# Patient Record
Sex: FEMALE | Race: WHITE | NOT HISPANIC OR LATINO | Employment: OTHER | URBAN - NONMETROPOLITAN AREA
[De-identification: names, ages, dates, MRNs, and addresses within clinical notes are randomized per-mention and may not be internally consistent; named-entity substitution may affect disease eponyms.]

---

## 2017-03-31 RX ORDER — ESTRADIOL 1 MG/1
TABLET ORAL
Qty: 30 TABLET | Refills: 11 | Status: SHIPPED | OUTPATIENT
Start: 2017-03-31 | End: 2018-03-20 | Stop reason: SDUPTHER

## 2017-06-06 ENCOUNTER — TRANSCRIBE ORDERS (OUTPATIENT)
Dept: ADMINISTRATIVE | Facility: HOSPITAL | Age: 57
End: 2017-06-06

## 2017-06-06 DIAGNOSIS — Z13.9 SCREENING: Primary | ICD-10-CM

## 2017-06-26 ENCOUNTER — HOSPITAL ENCOUNTER (OUTPATIENT)
Dept: MAMMOGRAPHY | Facility: HOSPITAL | Age: 57
Discharge: HOME | End: 2017-06-26
Attending: OBSTETRICS & GYNECOLOGY | Admitting: OBSTETRICS & GYNECOLOGY

## 2017-06-26 DIAGNOSIS — Z13.9 SCREENING: ICD-10-CM

## 2017-06-26 PROCEDURE — 77063 BREAST TOMOSYNTHESIS BI: CPT

## 2017-06-26 PROCEDURE — G0202 SCR MAMMO BI INCL CAD: HCPCS

## 2018-03-20 RX ORDER — ESTRADIOL 1 MG/1
TABLET ORAL
Qty: 30 TABLET | Refills: 1 | Status: SHIPPED | OUTPATIENT
Start: 2018-03-20 | End: 2018-05-18 | Stop reason: SDUPTHER

## 2018-05-18 RX ORDER — ESTRADIOL 1 MG/1
TABLET ORAL
Qty: 30 TABLET | Refills: 1 | Status: SHIPPED | OUTPATIENT
Start: 2018-05-18 | End: 2018-06-08 | Stop reason: SDUPTHER

## 2018-05-31 ENCOUNTER — TRANSCRIBE ORDERS (OUTPATIENT)
Dept: OBSTETRICS AND GYNECOLOGY | Facility: CLINIC | Age: 58
End: 2018-05-31

## 2018-05-31 DIAGNOSIS — Z12.39 SCREENING BREAST EXAMINATION: Primary | ICD-10-CM

## 2018-06-08 ENCOUNTER — OFFICE VISIT (OUTPATIENT)
Dept: OBSTETRICS AND GYNECOLOGY | Facility: CLINIC | Age: 58
End: 2018-06-08

## 2018-06-08 VITALS
DIASTOLIC BLOOD PRESSURE: 70 MMHG | BODY MASS INDEX: 26.31 KG/M2 | SYSTOLIC BLOOD PRESSURE: 118 MMHG | WEIGHT: 143 LBS | HEIGHT: 62 IN

## 2018-06-08 DIAGNOSIS — Z01.419 ENCOUNTER FOR GYNECOLOGICAL EXAMINATION WITHOUT ABNORMAL FINDING: Primary | ICD-10-CM

## 2018-06-08 PROCEDURE — 99396 PREV VISIT EST AGE 40-64: CPT | Performed by: OBSTETRICS & GYNECOLOGY

## 2018-06-08 RX ORDER — SUMATRIPTAN 100 MG/1
TABLET, FILM COATED ORAL
Refills: 0 | COMMUNITY
Start: 2018-05-21

## 2018-06-08 RX ORDER — FLUOXETINE HYDROCHLORIDE 20 MG/1
CAPSULE ORAL
Refills: 0 | COMMUNITY
Start: 2018-05-18 | End: 2022-07-19

## 2018-06-08 RX ORDER — FEXOFENADINE HCL 180 MG
TABLET ORAL
Refills: 0 | COMMUNITY
Start: 2018-05-14

## 2018-06-08 RX ORDER — OMEPRAZOLE 20 MG/1
CAPSULE, DELAYED RELEASE ORAL
Refills: 0 | COMMUNITY
Start: 2018-04-11 | End: 2022-03-14

## 2018-06-08 RX ORDER — ESTRADIOL 1 MG/1
1 TABLET ORAL DAILY
Qty: 30 TABLET | Refills: 12 | Status: SHIPPED | OUTPATIENT
Start: 2018-06-08 | End: 2019-07-06 | Stop reason: SDUPTHER

## 2018-06-08 RX ORDER — OXYBUTYNIN CHLORIDE 10 MG/1
TABLET, EXTENDED RELEASE ORAL
Refills: 1 | COMMUNITY
Start: 2018-05-10 | End: 2018-06-08

## 2018-06-08 RX ORDER — PRAVASTATIN SODIUM 20 MG
TABLET ORAL
Refills: 0 | COMMUNITY
Start: 2018-03-12 | End: 2019-09-18

## 2018-06-08 RX ORDER — SOLIFENACIN SUCCINATE 10 MG/1
10 TABLET, FILM COATED ORAL DAILY
Qty: 30 TABLET | Refills: 12 | Status: SHIPPED | OUTPATIENT
Start: 2018-06-08 | End: 2018-07-03

## 2018-06-08 NOTE — PROGRESS NOTES
Subjective   Chief Complaint   Patient presents with   • Gynecologic Exam     Last pap 3/29/2016 WNL     Nancy Richardson is a 57 y.o. year old  presenting to be seen for her annual exam.     SEXUAL Hx:  She is currently sexually active.  In the past year there has not been new sexual partners.    Condoms are not typically used.  She would not like to be screened for STD's at today's exam.  Current birth control method: menopause.  MENSTRUAL Hx:  No LMP recorded. Patient is postmenopausal.  In the past 6 months her cycles have been absent.   Her menstrual flow has been absent.   Each month on average there are roughly 0 days of very heavy flow.    Intermenstrual bleeding is absent.    Post-coital bleeding is absent.  Dysmenorrhea: is not affecting her activities of daily living  PMS: is not affecting her activities of daily living  Her cycles are not a source of concern for her that she wishes to discuss today.  HEALTH Hx:  She exercises regularly: no (and has no plans to become more active).  She wears her seat belt:yes.  She has concerns about domestic violence: no.  OTHER COMPLAINTS:  Nothing else    The following portions of the patient's history were reviewed and updated as appropriate:  She  has a past medical history of Depression.  She  does not have a problem list on file.  She  has a past surgical history that includes Oophorectomy;  section; and Anterior cruciate ligament repair.  Her family history includes Breast cancer in her maternal aunt, mother, and sister.  She  reports that she has never smoked. She has never used smokeless tobacco. She reports that she does not drink alcohol or use drugs.  Current Outpatient Prescriptions   Medication Sig Dispense Refill   • estradiol (ESTRACE) 1 MG tablet take 1 tablet by mouth once daily 30 tablet 1   • FLUoxetine (PROzac) 20 MG capsule TAKE 3 CAPSULES IN THE MORNING  0   • omeprazole (priLOSEC) 20 MG capsule   0   • oxybutynin XL  "(DITROPAN-XL) 10 MG 24 hr tablet   1   • pravastatin (PRAVACHOL) 20 MG tablet   0   • RA ALLERGY RELIEF 180 MG tablet   0   • SUMAtriptan (IMITREX) 100 MG tablet   0     No current facility-administered medications for this visit.      Current Outpatient Prescriptions on File Prior to Visit   Medication Sig   • estradiol (ESTRACE) 1 MG tablet take 1 tablet by mouth once daily     No current facility-administered medications on file prior to visit.      She is allergic to sulfa antibiotics..    Smoking status: Never Smoker                                                              Smokeless tobacco: Never Used                        Review of Systems  Consitutional NEG: anorexia or night sweats    POS: nothing reported   Gastointestinal NEG: bloating, change in bowel habits, melena or reflux symptoms    POS: nothing reported   Genitourinary NEG: dysuria or hematuria    POS: nothing reported   Integument NEG: moles that are changing in size, shape, color or rashes    POS: nothing reported   Breast NEG: persistent breast lump, skin dimpling or nipple discharge    POS: nothing reported          Objective   /70   Ht 157.5 cm (62\")   Wt 64.9 kg (143 lb)   BMI 26.16 kg/m²     General:  well developed; well nourished  no acute distress   Skin:  No suspicious lesions seen   Thyroid: normal to inspection and palpation   Breasts:  Examined in supine position  Symmetric without masses or skin dimpling  Nipples normal without inversion, lesions or discharge  There are no palpable axillary nodes   Abdomen: soft, non-tender; no masses  no umbilical or inginual hernias are present  no hepato-splenomegaly   Pelvis: Clinical staff was present for exam  External genitalia:  normal appearance of the external genitalia including Bartholin's and [place]'s glands.  :  urethral meatus normal;  Vaginal:  normal pink mucosa without prolapse or lesions.  Cervix:  normal appearance.  Uterus:  normal size, shape and " consistency.  Adnexa:  normal bimanual exam of the adnexa.  Rectal:  digital rectal exam not performed; anus visually normal appearing.        Assessment   1. Annual PE  2. UI     Plan   1. PAP done  2. Estraiol 1mg , and trial of Vesicare 5  3. Follow up     No orders of the defined types were placed in this encounter.         This note was electronically signed.      June 8, 2018

## 2018-06-14 ENCOUNTER — TELEPHONE (OUTPATIENT)
Dept: OBSTETRICS AND GYNECOLOGY | Facility: CLINIC | Age: 58
End: 2018-06-14

## 2018-06-14 DIAGNOSIS — Z01.419 ENCOUNTER FOR GYNECOLOGICAL EXAMINATION WITHOUT ABNORMAL FINDING: ICD-10-CM

## 2018-06-14 RX ORDER — FLUCONAZOLE 150 MG/1
150 TABLET ORAL ONCE
Qty: 1 TABLET | Refills: 0 | Status: SHIPPED | OUTPATIENT
Start: 2018-06-14 | End: 2018-06-14

## 2018-07-03 ENCOUNTER — TELEPHONE (OUTPATIENT)
Dept: OBSTETRICS AND GYNECOLOGY | Facility: CLINIC | Age: 58
End: 2018-07-03

## 2018-07-03 RX ORDER — FLUCONAZOLE 150 MG/1
TABLET ORAL
Qty: 2 TABLET | Refills: 0 | Status: SHIPPED | OUTPATIENT
Start: 2018-07-03 | End: 2019-09-18

## 2018-07-03 NOTE — TELEPHONE ENCOUNTER
Pt called stating that her insurance would not cover vesicare and wants to know if we can send her something else in. She has been on Oxybutin in the past and it did not work

## 2018-07-03 NOTE — TELEPHONE ENCOUNTER
----- Message from Tiffanie Meade sent at 7/2/2018  2:46 PM EDT -----  Contact: PATIENT  JOSE, PATIENT CALLED STATING THAT YOU WERE SUPPOSED TO CALL HER IN [facility] FOR AN YEAST INFECTION. SHE WANTED TO LET YOU KNOW THAT SHE IS BACK FROM [place] AND TO SEND IT IN.    [facility] [place] [facility] [place]  THANKS.

## 2018-07-05 ENCOUNTER — HOSPITAL ENCOUNTER (OUTPATIENT)
Dept: MAMMOGRAPHY | Facility: HOSPITAL | Age: 58
Discharge: HOME | End: 2018-07-05
Attending: OBSTETRICS & GYNECOLOGY | Admitting: OBSTETRICS & GYNECOLOGY

## 2018-07-05 DIAGNOSIS — Z12.39 SCREENING BREAST EXAMINATION: ICD-10-CM

## 2018-07-05 PROCEDURE — 77067 SCR MAMMO BI INCL CAD: CPT

## 2018-07-05 PROCEDURE — 77063 BREAST TOMOSYNTHESIS BI: CPT

## 2018-07-18 ENCOUNTER — TELEPHONE (OUTPATIENT)
Dept: OBSTETRICS AND GYNECOLOGY | Facility: CLINIC | Age: 58
End: 2018-07-18

## 2018-07-18 NOTE — TELEPHONE ENCOUNTER
Pt insurance did not cover Myrbetriq and wants to know if we can just refill her old prescription of Oxybutynin?

## 2018-07-19 RX ORDER — OXYBUTYNIN CHLORIDE 10 MG/1
10 TABLET, EXTENDED RELEASE ORAL DAILY
Qty: 30 TABLET | Refills: 11 | Status: SHIPPED | OUTPATIENT
Start: 2018-07-19 | End: 2019-08-09 | Stop reason: SDUPTHER

## 2019-07-08 RX ORDER — ESTRADIOL 1 MG/1
TABLET ORAL
Qty: 30 TABLET | Refills: 0 | Status: SHIPPED | OUTPATIENT
Start: 2019-07-08 | End: 2019-09-18 | Stop reason: SDUPTHER

## 2019-07-31 RX ORDER — OXYBUTYNIN CHLORIDE 10 MG/1
TABLET, EXTENDED RELEASE ORAL
Qty: 30 TABLET | Refills: 11 | OUTPATIENT
Start: 2019-07-31

## 2019-07-31 RX ORDER — ESTRADIOL 1 MG/1
TABLET ORAL
Qty: 30 TABLET | Refills: 0 | OUTPATIENT
Start: 2019-07-31

## 2019-08-09 RX ORDER — OXYBUTYNIN CHLORIDE 10 MG/1
TABLET, EXTENDED RELEASE ORAL
Qty: 30 TABLET | Refills: 11 | Status: SHIPPED | OUTPATIENT
Start: 2019-08-09 | End: 2019-09-18 | Stop reason: SDUPTHER

## 2019-09-16 ENCOUNTER — TRANSCRIBE ORDERS (OUTPATIENT)
Dept: MAMMOGRAPHY | Facility: HOSPITAL | Age: 59
End: 2019-09-16

## 2019-09-16 DIAGNOSIS — Z12.39 BREAST CANCER SCREENING: Primary | ICD-10-CM

## 2019-09-18 ENCOUNTER — OFFICE VISIT (OUTPATIENT)
Dept: OBSTETRICS AND GYNECOLOGY | Facility: CLINIC | Age: 59
End: 2019-09-18

## 2019-09-18 VITALS
HEIGHT: 62 IN | BODY MASS INDEX: 24.84 KG/M2 | DIASTOLIC BLOOD PRESSURE: 62 MMHG | SYSTOLIC BLOOD PRESSURE: 110 MMHG | WEIGHT: 135 LBS

## 2019-09-18 DIAGNOSIS — Z01.419 ENCOUNTER FOR GYNECOLOGICAL EXAMINATION WITHOUT ABNORMAL FINDING: Primary | ICD-10-CM

## 2019-09-18 PROCEDURE — 99396 PREV VISIT EST AGE 40-64: CPT | Performed by: OBSTETRICS & GYNECOLOGY

## 2019-09-18 RX ORDER — ESTRADIOL 1 MG/1
1 TABLET ORAL DAILY
Qty: 30 TABLET | Refills: 12 | Status: SHIPPED | OUTPATIENT
Start: 2019-09-18 | End: 2020-10-09

## 2019-09-18 RX ORDER — MELOXICAM 7.5 MG/1
7.5 TABLET ORAL DAILY
Refills: 0 | COMMUNITY
Start: 2019-08-14 | End: 2022-07-19

## 2019-09-18 RX ORDER — HEPATITIS A VACCINE 1440 [IU]/ML
INJECTION, SUSPENSION INTRAMUSCULAR
Refills: 0 | COMMUNITY
Start: 2019-08-14

## 2019-09-18 RX ORDER — OXYBUTYNIN CHLORIDE 10 MG/1
10 TABLET, EXTENDED RELEASE ORAL DAILY
Qty: 30 TABLET | Refills: 12 | Status: SHIPPED | OUTPATIENT
Start: 2019-09-18 | End: 2020-09-08

## 2019-09-18 RX ORDER — EZETIMIBE 10 MG/1
TABLET ORAL
Refills: 0 | COMMUNITY
Start: 2019-07-15

## 2019-09-18 NOTE — PROGRESS NOTES
Subjective   Chief Complaint   Patient presents with   • Gynecologic Exam     Last pap 2018 WNL, Mammogram scheduled 2019   • Med Refill     needs yearly refill estrace  and oxybutynin     Nancy Richardson is a 58 y.o. year old  presenting to be seen for her annual exam.     SEXUAL Hx:  She is currently sexually active.  In the past year there has not been new sexual partners.    Condoms are not typically used.  She would not like to be screened for STD's at today's exam.  Current birth control method: tubal ligation.  MENSTRUAL Hx:  No LMP recorded. Patient has had a hysterectomy.  In the past 6 months her cycles have been absent.   Her menstrual flow has been absent.   Each month on average there are roughly 0 days of very heavy flow.    Intermenstrual bleeding is absent.    Post-coital bleeding is absent.  Dysmenorrhea: is not affecting her activities of daily living  PMS: is not affecting her activities of daily living  Her cycles are not a source of concern for her that she wishes to discuss today.  HEALTH Hx:  She exercises regularly: no (and has no plans to become more active).  She wears her seat belt:yes.  She has concerns about domestic violence: no.  OTHER COMPLAINTS:  Nothing else    The following portions of the patient's history were reviewed and updated as appropriate:  She  has a past medical history of Depression.  She does not have a problem list on file.  She  has a past surgical history that includes Oophorectomy;  section; and Anterior cruciate ligament repair.  Her family history includes Breast cancer in her maternal aunt, mother, and sister.  She  reports that she has never smoked. She has never used smokeless tobacco. She reports that she does not drink alcohol or use drugs.  Current Outpatient Medications   Medication Sig Dispense Refill   • FLUoxetine (PROzac) 20 MG capsule TAKE 3 CAPSULES IN THE MORNING  0   • estradiol (ESTRACE) 1 MG tablet Take 1 tablet by  mouth Daily. 30 tablet 12   • ezetimibe (ZETIA) 10 MG tablet   0   • HAVRIX 1440 EL U/ML vaccine inject 1 milliliter intramuscularly  0   • meloxicam (MOBIC) 7.5 MG tablet Take 7.5 mg by mouth Daily. take with food or milk  0   • omeprazole (priLOSEC) 20 MG capsule   0   • oxybutynin XL (DITROPAN-XL) 10 MG 24 hr tablet Take 1 tablet by mouth Daily. 30 tablet 12   • RA ALLERGY RELIEF 180 MG tablet   0   • SUMAtriptan (IMITREX) 100 MG tablet   0     No current facility-administered medications for this visit.      Current Outpatient Medications on File Prior to Visit   Medication Sig   • FLUoxetine (PROzac) 20 MG capsule TAKE 3 CAPSULES IN THE MORNING   • ezetimibe (ZETIA) 10 MG tablet    • HAVRIX 1440 EL U/ML vaccine inject 1 milliliter intramuscularly   • meloxicam (MOBIC) 7.5 MG tablet Take 7.5 mg by mouth Daily. take with food or milk   • omeprazole (priLOSEC) 20 MG capsule    • RA ALLERGY RELIEF 180 MG tablet    • SUMAtriptan (IMITREX) 100 MG tablet    • [DISCONTINUED] estradiol (ESTRACE) 1 MG tablet take 1 tablet by mouth once daily   • [DISCONTINUED] fluconazole (DIFLUCAN) 150 MG tablet Take one pill now and repeat in 72 hours   • [DISCONTINUED] oxybutynin XL (DITROPAN-XL) 10 MG 24 hr tablet take 1 tablet by mouth once daily   • [DISCONTINUED] pravastatin (PRAVACHOL) 20 MG tablet      No current facility-administered medications on file prior to visit.      She is allergic to sulfa antibiotics..    Social History    Tobacco Use      Smoking status: Never Smoker      Smokeless tobacco: Never Used    Review of Systems  Consitutional NEG: anorexia or night sweats    POS: nothing reported   Gastointestinal NEG: bloating, change in bowel habits, melena or reflux symptoms    POS: nothing reported   Genitourinary NEG: dysuria or hematuria    POS: nothing reported   Integument NEG: moles that are changing in size, shape, color or rashes    POS: nothing reported   Breast NEG: persistent breast lump, skin dimpling or  "nipple discharge    POS: nothing reported          Objective   /62   Ht 157.5 cm (62\")   Wt 61.2 kg (135 lb)   BMI 24.69 kg/m²     General:  well developed; well nourished  no acute distress   Skin:  No suspicious lesions seen   Thyroid: normal to inspection and palpation   Breasts:  Examined in supine position  Symmetric without masses or skin dimpling  Nipples normal without inversion, lesions or discharge  There are no palpable axillary nodes   Abdomen: soft, non-tender; no masses  no umbilical or inguinal hernias are present  no hepato-splenomegaly   Pelvis: Clinical staff was present for exam  External genitalia:  normal appearance of the external genitalia including Bartholin's and [place]'s glands.  :  urethral meatus normal;  Vaginal:  normal pink mucosa without prolapse or lesions.  Cervix:  absent.  Uterus:  absent.  Adnexa:  normal bimanual exam of the adnexa.  Rectal:  digital rectal exam not performed; anus visually normal appearing.        Assessment   1. Normal PE     Plan   1. PAP done  2.   3. Follow up     New Medications Ordered This Visit   Medications   • estradiol (ESTRACE) 1 MG tablet     Sig: Take 1 tablet by mouth Daily.     Dispense:  30 tablet     Refill:  12     NEEDS APPOINTMENT BEFORE FURTHER REFILLS.   • oxybutynin XL (DITROPAN-XL) 10 MG 24 hr tablet     Sig: Take 1 tablet by mouth Daily.     Dispense:  30 tablet     Refill:  12          This note was electronically signed.      September 18, 2019    "

## 2019-10-01 DIAGNOSIS — Z01.419 ENCOUNTER FOR GYNECOLOGICAL EXAMINATION WITHOUT ABNORMAL FINDING: ICD-10-CM

## 2019-11-12 ENCOUNTER — HOSPITAL ENCOUNTER (OUTPATIENT)
Dept: MAMMOGRAPHY | Facility: HOSPITAL | Age: 59
Discharge: HOME | End: 2019-11-12
Admitting: OBSTETRICS & GYNECOLOGY

## 2019-11-12 DIAGNOSIS — Z12.39 BREAST CANCER SCREENING: ICD-10-CM

## 2019-11-12 PROCEDURE — 77067 SCR MAMMO BI INCL CAD: CPT

## 2019-11-12 PROCEDURE — 77063 BREAST TOMOSYNTHESIS BI: CPT

## 2020-09-08 RX ORDER — OXYBUTYNIN CHLORIDE 10 MG/1
TABLET, EXTENDED RELEASE ORAL
Qty: 90 TABLET | Refills: 4 | Status: SHIPPED | OUTPATIENT
Start: 2020-09-08 | End: 2021-05-26 | Stop reason: SDUPTHER

## 2020-10-02 ENCOUNTER — TRANSCRIBE ORDERS (OUTPATIENT)
Dept: ADMINISTRATIVE | Facility: HOSPITAL | Age: 60
End: 2020-10-02

## 2020-10-02 DIAGNOSIS — Z12.31 VISIT FOR SCREENING MAMMOGRAM: Primary | ICD-10-CM

## 2020-10-09 RX ORDER — ESTRADIOL 1 MG/1
TABLET ORAL
Qty: 90 TABLET | Refills: 0 | Status: SHIPPED | OUTPATIENT
Start: 2020-10-09 | End: 2021-01-05

## 2021-01-05 RX ORDER — ESTRADIOL 1 MG/1
TABLET ORAL
Qty: 90 TABLET | Refills: 0 | Status: SHIPPED | OUTPATIENT
Start: 2021-01-05 | End: 2021-05-26 | Stop reason: SDUPTHER

## 2021-02-25 DIAGNOSIS — Z23 IMMUNIZATION DUE: ICD-10-CM

## 2021-05-03 ENCOUNTER — TRANSCRIBE ORDERS (OUTPATIENT)
Dept: ADMINISTRATIVE | Facility: HOSPITAL | Age: 61
End: 2021-05-03

## 2021-05-03 DIAGNOSIS — M54.9 DORSALGIA: ICD-10-CM

## 2021-05-03 DIAGNOSIS — Z12.31 BREAST CANCER SCREENING BY MAMMOGRAM: Primary | ICD-10-CM

## 2021-05-26 ENCOUNTER — OFFICE VISIT (OUTPATIENT)
Dept: OBSTETRICS AND GYNECOLOGY | Facility: CLINIC | Age: 61
End: 2021-05-26

## 2021-05-26 VITALS
WEIGHT: 137 LBS | HEIGHT: 62 IN | DIASTOLIC BLOOD PRESSURE: 64 MMHG | SYSTOLIC BLOOD PRESSURE: 110 MMHG | BODY MASS INDEX: 25.21 KG/M2

## 2021-05-26 DIAGNOSIS — Z12.4 SCREENING FOR MALIGNANT NEOPLASM OF CERVIX: ICD-10-CM

## 2021-05-26 DIAGNOSIS — Z12.39 ENCOUNTER FOR BREAST CANCER SCREENING OTHER THAN MAMMOGRAM: ICD-10-CM

## 2021-05-26 DIAGNOSIS — Z76.0 MEDICATION REFILL: ICD-10-CM

## 2021-05-26 DIAGNOSIS — Z01.419 ENCOUNTER FOR GYNECOLOGICAL EXAMINATION WITHOUT ABNORMAL FINDING: Primary | ICD-10-CM

## 2021-05-26 DIAGNOSIS — N39.3 STRESS INCONTINENCE: ICD-10-CM

## 2021-05-26 PROCEDURE — 99396 PREV VISIT EST AGE 40-64: CPT | Performed by: OBSTETRICS & GYNECOLOGY

## 2021-05-26 RX ORDER — ESTRADIOL 1 MG/1
1 TABLET ORAL DAILY
Qty: 90 TABLET | Refills: 6 | Status: SHIPPED | OUTPATIENT
Start: 2021-05-26 | End: 2021-09-27

## 2021-05-26 RX ORDER — OXYBUTYNIN CHLORIDE 10 MG/1
10 TABLET, EXTENDED RELEASE ORAL DAILY
Qty: 90 TABLET | Refills: 6 | Status: SHIPPED | OUTPATIENT
Start: 2021-05-26 | End: 2022-01-12

## 2021-05-26 NOTE — PROGRESS NOTES
Subjective   Chief Complaint   Patient presents with   • Gynecologic Exam     last pap 2019 WNL, Mammogram/dexa scheduled 21     Nancy Richardson is a 60 y.o. year old  presenting to be seen for her annual exam.     SEXUAL Hx:  She is currently sexually active.  In the past year there has not been new sexual partners.    Condoms are not typically used.  She would not like to be screened for STD's at today's exam.  Current birth control method: hyst.  MENSTRUAL Hx:  No LMP recorded. Patient has had a hysterectomy.  In the past 6 months her cycles have been absent.   Her menstrual flow has been absent.   Each month on average there are roughly 0 days of very heavy flow.    Intermenstrual bleeding is absent.    Post-coital bleeding is absent.  Dysmenorrhea: is not affecting her activities of daily living  PMS: is not affecting her activities of daily living  Her cycles are not a source of concern for her that she wishes to discuss today.  HEALTH Hx:  She exercises regularly: no (and has no plans to become more active).  She wears her seat belt:yes.  She has concerns about domestic violence: no.  OTHER COMPLAINTS:  Nothing else    The following portions of the patient's history were reviewed and updated as appropriate:  She  has a past medical history of Depression.  She does not have a problem list on file.  She  has a past surgical history that includes Oophorectomy;  section; and Anterior cruciate ligament repair.  Her family history includes Breast cancer in her maternal aunt, mother, and sister.  She  reports that she has never smoked. She has never used smokeless tobacco. She reports that she does not drink alcohol and does not use drugs.  Current Outpatient Medications   Medication Sig Dispense Refill   • estradiol (ESTRACE) 1 MG tablet Take 1 tablet by mouth Daily. 90 tablet 6   • ezetimibe (ZETIA) 10 MG tablet   0   • FLUoxetine (PROzac) 20 MG capsule TAKE 3 CAPSULES IN THE MORNING  0  "  • HAVRIX 1440 EL U/ML vaccine inject 1 milliliter intramuscularly  0   • meloxicam (MOBIC) 7.5 MG tablet Take 7.5 mg by mouth Daily. take with food or milk  0   • omeprazole (priLOSEC) 20 MG capsule   0   • oxybutynin XL (DITROPAN-XL) 10 MG 24 hr tablet Take 1 tablet by mouth Daily. 90 tablet 6   • RA ALLERGY RELIEF 180 MG tablet   0   • SUMAtriptan (IMITREX) 100 MG tablet   0     No current facility-administered medications for this visit.     Current Outpatient Medications on File Prior to Visit   Medication Sig   • ezetimibe (ZETIA) 10 MG tablet    • FLUoxetine (PROzac) 20 MG capsule TAKE 3 CAPSULES IN THE MORNING   • HAVRIX 1440 EL U/ML vaccine inject 1 milliliter intramuscularly   • meloxicam (MOBIC) 7.5 MG tablet Take 7.5 mg by mouth Daily. take with food or milk   • omeprazole (priLOSEC) 20 MG capsule    • RA ALLERGY RELIEF 180 MG tablet    • SUMAtriptan (IMITREX) 100 MG tablet    • [DISCONTINUED] estradiol (ESTRACE) 1 MG tablet TAKE 1 TABLET BY MOUTH EVERY DAY   • [DISCONTINUED] oxybutynin XL (DITROPAN-XL) 10 MG 24 hr tablet TAKE 1 TABLET BY MOUTH ONCE DAILY     No current facility-administered medications on file prior to visit.     She is allergic to sulfa antibiotics..    Social History    Tobacco Use      Smoking status: Never Smoker      Smokeless tobacco: Never Used    Review of Systems  Consitutional NEG: anorexia or night sweats    POS: nothing reported   Gastointestinal NEG: bloating, change in bowel habits, melena or reflux symptoms    POS: nothing reported   Genitourinary NEG: dysuria or hematuria    POS: nothing reported   Integument NEG: moles that are changing in size, shape, color or rashes    POS: nothing reported   Breast NEG: persistent breast lump, skin dimpling or nipple discharge    POS: nothing reported          Objective   /64   Ht 157.5 cm (62\")   Wt 62.1 kg (137 lb)   BMI 25.06 kg/m²     General:  well developed; well nourished  no acute distress   Skin:  No suspicious " lesions seen   Thyroid: normal to inspection and palpation   Breasts:  Examined in supine position  Symmetric without masses or skin dimpling  Nipples normal without inversion, lesions or discharge  There are no palpable axillary nodes   Abdomen: soft, non-tender; no masses  no umbilical or inguinal hernias are present  no hepato-splenomegaly   Pelvis: Clinical staff was present for exam  External genitalia:  normal appearance of the external genitalia including Bartholin's and [place]'s glands.  :  urethral meatus normal;  Vaginal:  normal pink mucosa without prolapse or lesions.  Cervix:  absent.  Uterus:  absent.  Adnexa:  absent, bilateral.  Rectal:  digital rectal exam not performed; anus visually normal appearing.        Assessment   1. Normal PE  2. GSUI     Plan   1. PAP done  2. MMG and DEXA set up next month  3. Follow up URO for GSUI, go ahead and come off Ditropan    New Medications Ordered This Visit   Medications   • estradiol (ESTRACE) 1 MG tablet     Sig: Take 1 tablet by mouth Daily.     Dispense:  90 tablet     Refill:  6   • oxybutynin XL (DITROPAN-XL) 10 MG 24 hr tablet     Sig: Take 1 tablet by mouth Daily.     Dispense:  90 tablet     Refill:  6          This note was electronically signed.      May 26, 2021

## 2021-06-01 DIAGNOSIS — Z01.419 ENCOUNTER FOR GYNECOLOGICAL EXAMINATION WITHOUT ABNORMAL FINDING: ICD-10-CM

## 2021-06-16 ENCOUNTER — APPOINTMENT (OUTPATIENT)
Dept: BONE DENSITY | Facility: HOSPITAL | Age: 61
End: 2021-06-16

## 2021-06-16 ENCOUNTER — HOSPITAL ENCOUNTER (OUTPATIENT)
Dept: MAMMOGRAPHY | Facility: HOSPITAL | Age: 61
Discharge: HOME | End: 2021-06-16

## 2021-06-16 DIAGNOSIS — M54.9 DORSALGIA: ICD-10-CM

## 2021-06-16 DIAGNOSIS — Z12.31 BREAST CANCER SCREENING BY MAMMOGRAM: ICD-10-CM

## 2021-06-16 PROCEDURE — 77067 SCR MAMMO BI INCL CAD: CPT

## 2021-06-16 PROCEDURE — 77063 BREAST TOMOSYNTHESIS BI: CPT

## 2021-06-16 PROCEDURE — 77080 DXA BONE DENSITY AXIAL: CPT

## 2021-09-27 ENCOUNTER — OFFICE VISIT (OUTPATIENT)
Dept: UROLOGY | Facility: CLINIC | Age: 61
End: 2021-09-27

## 2021-09-27 VITALS
OXYGEN SATURATION: 95 % | HEART RATE: 61 BPM | SYSTOLIC BLOOD PRESSURE: 124 MMHG | BODY MASS INDEX: 25.21 KG/M2 | HEIGHT: 62 IN | TEMPERATURE: 97.3 F | WEIGHT: 137 LBS | DIASTOLIC BLOOD PRESSURE: 64 MMHG

## 2021-09-27 DIAGNOSIS — R32 URINARY INCONTINENCE, UNSPECIFIED TYPE: Primary | ICD-10-CM

## 2021-09-27 LAB
BILIRUB BLD-MCNC: NEGATIVE MG/DL
CLARITY, POC: CLEAR
COLOR UR: YELLOW
GLUCOSE UR STRIP-MCNC: NEGATIVE MG/DL
KETONES UR QL: NEGATIVE
LEUKOCYTE EST, POC: NEGATIVE
NITRITE UR-MCNC: NEGATIVE MG/ML
PH UR: 6 [PH] (ref 5–8)
PROT UR STRIP-MCNC: NEGATIVE MG/DL
RBC # UR STRIP: NEGATIVE /UL
SP GR UR: 1.01 (ref 1–1.03)
UROBILINOGEN UR QL: NORMAL

## 2021-09-27 PROCEDURE — 81003 URINALYSIS AUTO W/O SCOPE: CPT | Performed by: UROLOGY

## 2021-09-27 PROCEDURE — 99244 OFF/OP CNSLTJ NEW/EST MOD 40: CPT | Performed by: UROLOGY

## 2021-09-27 PROCEDURE — 51798 US URINE CAPACITY MEASURE: CPT | Performed by: UROLOGY

## 2021-09-27 RX ORDER — ESTRADIOL 0.1 MG/G
2 CREAM VAGINAL 3 TIMES WEEKLY
Qty: 42.5 G | Refills: 12 | Status: SHIPPED | OUTPATIENT
Start: 2021-09-27 | End: 2022-11-07 | Stop reason: SDUPTHER

## 2021-09-27 NOTE — PROGRESS NOTES
Chief Complaint  Chief Complaint   Patient presents with   • Advice Only     New Patient here for evaluation of stress incontinance        Referring Provider  [place]*    HPI  Ms. Richardson is a 60 y.o. female presents with complaint of urinary incontinence for >10 years.     Pt has primarily stress urinary incontinence.     Severity: Pt uses 1-3 pads a day and has tried a TOT sling with Dr. Boateng in the past. Tried Vesicare and ditropan in past  Associated Sx: Pt has no h/o daytime frequency, urgency and nocturia.     No h/o poor stream, straining, hesitancy or incomplete voiding.     No h/o recurrent UTI, hematuria, nephrolithiasis    No vaginal discharge or bleeding.  No h/o something coming out of vagina   no Constipation - controlled well with miralax  0 Vaginal deliveries    Past Medical History  Past Medical History:   Diagnosis Date   • Depression        Past Surgical History  Past Surgical History:   Procedure Laterality Date   •  SECTION     • KNEE ACL RECONSTRUCTION     • OOPHORECTOMY         Medications  Current Outpatient Medications   Medication Sig Dispense Refill   • estradiol (ESTRACE) 1 MG tablet Take 1 tablet by mouth Daily. 90 tablet 6   • ezetimibe (ZETIA) 10 MG tablet   0   • FLUoxetine (PROzac) 20 MG capsule TAKE 3 CAPSULES IN THE MORNING  0   • omeprazole (priLOSEC) 20 MG capsule   0   • RA ALLERGY RELIEF 180 MG tablet   0   • SUMAtriptan (IMITREX) 100 MG tablet   0   • HAVRIX 1440 EL U/ML vaccine inject 1 milliliter intramuscularly  0   • meloxicam (MOBIC) 7.5 MG tablet Take 7.5 mg by mouth Daily. take with food or milk  0   • oxybutynin XL (DITROPAN-XL) 10 MG 24 hr tablet Take 1 tablet by mouth Daily. 90 tablet 6     No current facility-administered medications for this visit.       Allergies  Allergies   Allergen Reactions   • Sulfa Antibiotics Anaphylaxis       Social History  Social History     Socioeconomic History   • Marital status:      Spouse name: Not on  "file   • Number of children: Not on file   • Years of education: Not on file   • Highest education level: Not on file   Tobacco Use   • Smoking status: Never Smoker   • Smokeless tobacco: Never Used   Vaping Use   • Vaping Use: Never used   Substance and Sexual Activity   • Alcohol use: No   • Drug use: No   • Sexual activity: Defer     Partners: Male     Birth control/protection: Post-menopausal       Family History  Family History   Problem Relation Age of Onset   • Breast cancer Mother    • Breast cancer Sister    • Breast cancer Maternal Aunt        Physical Exam  Visit Vitals  /64   Pulse 61   Temp 97.3 °F (36.3 °C)   Ht 157.5 cm (62\")   Wt 62.1 kg (137 lb)   SpO2 95%   BMI 25.06 kg/m²     Physical exam was notable for positive stress urinary incontinence on pelvic exam.  Positive vaginal atrophy.  No pelvic organ prolapse..    Labs  Brief Urine Lab Results  (Last result in the past 365 days)      Color   Clarity   Blood   Leuk Est   Nitrite   Protein   CREAT   Urine HCG        09/27/21 1511 Yellow Clear Negative Negative Negative Negative               Lab Results   Component Value Date    CALCIUM 9.7 12/07/2015     12/07/2015    K 4.6 12/07/2015    CO2 29 12/07/2015     12/07/2015    BUN 14 12/07/2015    CREATININE 0.7 12/07/2015    ANIONGAP 14 12/07/2015       Lab Results   Component Value Date    WBC 5.7 12/07/2015    HGB 12.8 12/07/2015    HCT 39 12/07/2015    MCV 91.6 12/07/2015     12/07/2015       PVR  Post-void residual performed by staff - 0 mL     I have personally reviewed her labs and post void residual imaging.     Assessment  Ms. Richardson is a 60 y.o. female with recurrent stress urinary incontinence after TOT sling > 10 years ago.     We discussed the AUA guidelines for management of stress urinary incontinence.  We discussed served of management with lifestyle changes like weight reduction and pelvic floor physical therapy.  We discussed surgical management with either " pubovaginal sling sling versus cystoscopy with bulking agent injection.  The patient has elected cystoscopy with bulking agent injection.  We discussed the risks, benefits, and alternatives.  The patient voiced her understanding and wished to proceed.    Plan  1. Cystoscopy with bulking agent at [facility] 10/26. Risk factors are prior history of sling

## 2021-11-23 ENCOUNTER — TELEPHONE (OUTPATIENT)
Dept: UROLOGY | Facility: CLINIC | Age: 61
End: 2021-11-23

## 2021-11-30 ENCOUNTER — TELEPHONE (OUTPATIENT)
Dept: UROLOGY | Facility: CLINIC | Age: 61
End: 2021-11-30

## 2021-12-28 ENCOUNTER — OUTSIDE FACILITY SERVICE (OUTPATIENT)
Dept: UROLOGY | Facility: CLINIC | Age: 61
End: 2021-12-28

## 2021-12-28 PROCEDURE — 51715 ENDOSCOPIC INJECTION/IMPLANT: CPT | Performed by: UROLOGY

## 2021-12-29 ENCOUNTER — TELEPHONE (OUTPATIENT)
Dept: UROLOGY | Facility: CLINIC | Age: 61
End: 2021-12-29

## 2021-12-29 DIAGNOSIS — R32 URINARY INCONTINENCE, UNSPECIFIED TYPE: Primary | ICD-10-CM

## 2021-12-29 RX ORDER — CIPROFLOXACIN 500 MG/1
500 TABLET, FILM COATED ORAL 2 TIMES DAILY
Qty: 6 TABLET | Refills: 0 | Status: SHIPPED | OUTPATIENT
Start: 2021-12-29 | End: 2022-01-12

## 2021-12-29 NOTE — TELEPHONE ENCOUNTER
Hub staff attempted to follow warm transfer process and was unsuccessful     Caller: Nancy Richardson    Relationship to patient: Self    Best call back number: [phone]    Patient is needing: SHE HAD PROCEDURE ( URETHA BULING YESTERDAY WITH DR. NUÑEZ) HE TOLD HER HE WOULD CALL IN ANTIBOTIC. HER PHARMACY HASNT RECEIVED IT AS OF TODAY.

## 2022-01-12 ENCOUNTER — OFFICE VISIT (OUTPATIENT)
Dept: UROLOGY | Facility: CLINIC | Age: 62
End: 2022-01-12

## 2022-01-12 VITALS
SYSTOLIC BLOOD PRESSURE: 118 MMHG | HEIGHT: 62 IN | RESPIRATION RATE: 19 BRPM | HEART RATE: 50 BPM | BODY MASS INDEX: 25.21 KG/M2 | TEMPERATURE: 98.1 F | WEIGHT: 137 LBS | OXYGEN SATURATION: 97 % | DIASTOLIC BLOOD PRESSURE: 68 MMHG

## 2022-01-12 DIAGNOSIS — R32 URINARY INCONTINENCE, UNSPECIFIED TYPE: ICD-10-CM

## 2022-01-12 DIAGNOSIS — N39.41 URGE INCONTINENCE: ICD-10-CM

## 2022-01-12 DIAGNOSIS — N95.8 GENITOURINARY SYNDROME OF MENOPAUSE: Primary | ICD-10-CM

## 2022-01-12 PROCEDURE — 51798 US URINE CAPACITY MEASURE: CPT | Performed by: UROLOGY

## 2022-01-12 PROCEDURE — 99214 OFFICE O/P EST MOD 30 MIN: CPT | Performed by: UROLOGY

## 2022-01-12 RX ORDER — ESTRADIOL 1 MG/1
1 TABLET ORAL DAILY
COMMUNITY
Start: 2021-12-20 | End: 2022-01-12

## 2022-01-12 RX ORDER — FLUTICASONE PROPIONATE 50 MCG
SPRAY, SUSPENSION (ML) NASAL
COMMUNITY
Start: 2022-01-03

## 2022-01-12 NOTE — PROGRESS NOTES
"Chief complaint  Stress urinary incontinence  Genitourinary syndrome menopause    HPI  Ms. Richardson is a 61 y.o. female with history below in assessment, who presents for follow up.     At this visit patient is doing well.  She has overall had great improvement in her stress urinary incontinence and is able to exercise.    Past Medical History:   Diagnosis Date   • Depression        Past Surgical History:   Procedure Laterality Date   •  SECTION     • KNEE ACL RECONSTRUCTION     • OOPHORECTOMY           Current Outpatient Medications:   •  estradiol (ESTRACE) 1 MG tablet, Take 1 mg by mouth Daily., Disp: , Rfl:   •  ezetimibe (ZETIA) 10 MG tablet, , Disp: , Rfl: 0  •  FLUoxetine (PROzac) 20 MG capsule, TAKE 3 CAPSULES IN THE MORNING, Disp: , Rfl: 0  •  fluticasone (FLONASE) 50 MCG/ACT nasal spray, SHAKE LIQUID AND USE 1 TO 2 SPRAYS IN EACH NOSTRIL EVERY DAY AS NEEDED, Disp: , Rfl:   •  HAVRIX 1440 EL U/ML vaccine, inject 1 milliliter intramuscularly, Disp: , Rfl: 0  •  meloxicam (MOBIC) 7.5 MG tablet, Take 7.5 mg by mouth Daily. take with food or milk, Disp: , Rfl: 0  •  omeprazole (priLOSEC) 20 MG capsule, , Disp: , Rfl: 0  •  RA ALLERGY RELIEF 180 MG tablet, , Disp: , Rfl: 0  •  SUMAtriptan (IMITREX) 100 MG tablet, , Disp: , Rfl: 0  •  ciprofloxacin (Cipro) 500 MG tablet, Take 1 tablet by mouth 2 (Two) Times a Day., Disp: 6 tablet, Rfl: 0  •  estradiol (ESTRACE) 0.1 MG/GM vaginal cream, Insert 2 g into the vagina 3 (Three) Times a Week., Disp: 42.5 g, Rfl: 12  •  oxybutynin XL (DITROPAN-XL) 10 MG 24 hr tablet, Take 1 tablet by mouth Daily., Disp: 90 tablet, Rfl: 6     Physical Exam  Visit Vitals  /68   Pulse 50   Temp 98.1 °F (36.7 °C)   Resp 19   Ht 157.5 cm (62.01\")   Wt 62.1 kg (137 lb)   SpO2 97%   BMI 25.05 kg/m²       Labs  Brief Urine Lab Results  (Last result in the past 365 days)      Color   Clarity   Blood   Leuk Est   Nitrite   Protein   CREAT   Urine HCG        21 1511 Yellow   " Clear   Negative   Negative   Negative   Negative                 Lab Results   Component Value Date    GLUCOSE 96 12/07/2015    CALCIUM 9.7 12/07/2015     12/07/2015    K 4.6 12/07/2015    CO2 29 12/07/2015     12/07/2015    BUN 14 12/07/2015    CREATININE 0.7 12/07/2015    ANIONGAP 14 12/07/2015       Lab Results   Component Value Date    WBC 5.7 12/07/2015    HGB 12.8 12/07/2015    HCT 39 12/07/2015    MCV 91.6 12/07/2015     12/07/2015         Radiographic Studies  No Images in the past 120 days found..    I have reviewed the above labs and imaging.     Assessment  61 y.o. female with Hx of GIDLA and GSM.  She is status post TOT sling several years ago with return of stress incontinence, now status post cystoscopy with bulking agent injection, with resolution of stress incontinence.    I did tell her that the bulking agent is temporary, and would wear off eventually.  On average these last patients from 1 to 5 years.  We also talked about management of genitourinary syndrome of menopause.  I recommended topical estrogen cream, as the risk for blood clots and breast cancer lower and treats many of the local's symptoms better in my experience than a systemic estradiol pill.  It has been over 10 years since she went through surgical menopause.    Plan  1. FU in 1 yr  2. Stop etradiol pill and use cream 3x per week

## 2022-03-02 ENCOUNTER — TELEPHONE (OUTPATIENT)
Dept: UROLOGY | Facility: CLINIC | Age: 62
End: 2022-03-02

## 2022-03-02 NOTE — TELEPHONE ENCOUNTER
Provider: DR NUÑEZ  Caller: RENO PETERSON  Relationship to Patient: SELF  Pharmacy: [id number] [address] AT NEC OF [facility] - [phone] [facility] [phone]      Phone Number: [phone]  Reason for Call: PT HAD RIGID CYSTOSCOPY, INJECTION OF DURASPHERE BULKING AGENT ON 12/28/21 & IT ONLY HELPED ABOUT 3 WEEKS & HER SYMPTOMS HAVE RETURNED. STATES WAS IN [place] A FEW WEEKS & HAS NOT USED PRESCRIBED CREAM RECENTLY. PT WOULD LIKE A CALL BACK POSSIBLY TO SCHEDULE FOLLOW UP. HUB UNABLE TO WARM TRANSFER.  When was the patient last seen: 1/12/22  When did it start: AROUND 1/20/22    Characteristics of symptom/severity: LEAKAGE WHEN WALKING OR JARRING BODY IN ANY WAY, NOT AS SEVERE AS PRIOR TO PROCEDURE BUT DOES HAVE TO WEAR A PAD ALL DAY EVERY DAY.  Timing- Is it constant or intermittent: CONSTANT, JUST NOT AS MUCH LEAKAGE AS BEFORE  What makes it worse: WALKING & MOVING AROUND

## 2022-03-14 ENCOUNTER — OFFICE VISIT (OUTPATIENT)
Dept: UROLOGY | Facility: CLINIC | Age: 62
End: 2022-03-14

## 2022-03-14 VITALS
HEIGHT: 62 IN | OXYGEN SATURATION: 97 % | DIASTOLIC BLOOD PRESSURE: 68 MMHG | HEART RATE: 50 BPM | WEIGHT: 137 LBS | BODY MASS INDEX: 25.21 KG/M2 | TEMPERATURE: 97.6 F | SYSTOLIC BLOOD PRESSURE: 120 MMHG

## 2022-03-14 DIAGNOSIS — N39.41 URGE INCONTINENCE: Primary | ICD-10-CM

## 2022-03-14 LAB
BILIRUB BLD-MCNC: NEGATIVE MG/DL
CLARITY, POC: CLEAR
COLOR UR: YELLOW
EXPIRATION DATE: NORMAL
GLUCOSE UR STRIP-MCNC: NEGATIVE MG/DL
KETONES UR QL: NEGATIVE
LEUKOCYTE EST, POC: NEGATIVE
Lab: NORMAL
NITRITE UR-MCNC: NEGATIVE MG/ML
PH UR: 7 [PH] (ref 5–8)
PROT UR STRIP-MCNC: NEGATIVE MG/DL
RBC # UR STRIP: NEGATIVE /UL
SP GR UR: 1.01 (ref 1–1.03)
UROBILINOGEN UR QL: NORMAL

## 2022-03-14 PROCEDURE — 99214 OFFICE O/P EST MOD 30 MIN: CPT | Performed by: UROLOGY

## 2022-03-14 PROCEDURE — 81003 URINALYSIS AUTO W/O SCOPE: CPT | Performed by: UROLOGY

## 2022-03-14 RX ORDER — ESTRADIOL 0.5 MG/1
1 TABLET ORAL DAILY
COMMUNITY
End: 2022-06-30 | Stop reason: HOSPADM

## 2022-03-14 NOTE — PROGRESS NOTES
"Chief Complaint   Patient presents with   • Follow-up     Urge Incontinence      HPI  Ms. Richardson is a 61 y.o. female with history below in assessment, who presents for follow up.     At this visit she feels that her GILDA has worsened from initial postop. Still not as bad as it was but has to wear 1ppd.     Past Medical History:   Diagnosis Date   • Depression        Past Surgical History:   Procedure Laterality Date   •  SECTION     • KNEE ACL RECONSTRUCTION     • OOPHORECTOMY           Current Outpatient Medications:   •  estradiol (ESTRACE) 0.5 MG tablet, Take 1 mg by mouth Daily., Disp: , Rfl:   •  ezetimibe (ZETIA) 10 MG tablet, , Disp: , Rfl: 0  •  FLUoxetine (PROzac) 20 MG capsule, TAKE 3 CAPSULES IN THE MORNING, Disp: , Rfl: 0  •  fluticasone (FLONASE) 50 MCG/ACT nasal spray, SHAKE LIQUID AND USE 1 TO 2 SPRAYS IN EACH NOSTRIL EVERY DAY AS NEEDED, Disp: , Rfl:   •  HAVRIX 1440 EL U/ML vaccine, inject 1 milliliter intramuscularly, Disp: , Rfl: 0  •  omeprazole (priLOSEC) 20 MG capsule, , Disp: , Rfl: 0  •  RA ALLERGY RELIEF 180 MG tablet, , Disp: , Rfl: 0  •  SUMAtriptan (IMITREX) 100 MG tablet, , Disp: , Rfl: 0  •  estradiol (ESTRACE) 0.1 MG/GM vaginal cream, Insert 2 g into the vagina 3 (Three) Times a Week., Disp: 42.5 g, Rfl: 12  •  meloxicam (MOBIC) 7.5 MG tablet, Take 7.5 mg by mouth Daily. take with food or milk, Disp: , Rfl: 0     Physical Exam  Visit Vitals  /68   Pulse 50   Temp 97.6 °F (36.4 °C)   Ht 157.5 cm (62\")   Wt 62.1 kg (137 lb)   SpO2 97%   Breastfeeding No   BMI 25.06 kg/m²       Labs  Brief Urine Lab Results  (Last result in the past 365 days)      Color   Clarity   Blood   Leuk Est   Nitrite   Protein   CREAT   Urine HCG        22 0946 Yellow   Clear   Negative   Negative   Negative   Negative                 Lab Results   Component Value Date    GLUCOSE 96 2015    CALCIUM 9.7 2015     2015    K 4.6 2015    CO2 29 2015     " 12/07/2015    BUN 14 12/07/2015    CREATININE 0.7 12/07/2015    ANIONGAP 14 12/07/2015       Lab Results   Component Value Date    WBC 5.7 12/07/2015    HGB 12.8 12/07/2015    HCT 39 12/07/2015    MCV 91.6 12/07/2015     12/07/2015       Radiographic Studies  No Images in the past 120 days found..    I have reviewed the above labs and imaging.     Assessment  61 y.o. female with Hx of GILDA and GSM.  She is status post TOT sling several years ago with return of stress incontinence, now status post cystoscopy with bulking agent injection, with resolution of stress incontinence, however based on history it seems stress incontinence may be returning.  This is an exacerbation of chronic disease.    We discussed different options including TRT sling excision with pubovaginal sling, versus repeat bulking agent injection with a different kind of bulking agent injected in a different way.    Plan  1. FU for UDS. Will consider repeat culking agent with bulkamid if she has low VLPP.  Risks associated with procedure are multiple prior past procedures.

## 2022-03-28 ENCOUNTER — TELEPHONE (OUTPATIENT)
Dept: UROLOGY | Facility: CLINIC | Age: 62
End: 2022-03-28

## 2022-03-28 NOTE — TELEPHONE ENCOUNTER
Caller: Nancy Richardson    Relationship to patient: Self    Best call back number: [phone]    Patient is needing: PATIENT WAS WAITING TO HEAR BACK TO SCHEDULE A TEST. PATIENT WAS LAST SEEN 3/14/22. SHE THINKS IT MIGHT BE MOHSEN WHO SHE WAS SUPPOSED TO TALK TO.    HUB ATTEMPTED TO WARM TRANSFER BUT GOT VOICEMAIL.     PATIENT REQUESTING A CALL BACK @ [phone]

## 2022-05-10 ENCOUNTER — OFFICE VISIT (OUTPATIENT)
Dept: UROLOGY | Facility: CLINIC | Age: 62
End: 2022-05-10
Payer: COMMERCIAL

## 2022-05-10 DIAGNOSIS — N39.3 STRESS INCONTINENCE OF URINE: ICD-10-CM

## 2022-05-31 ENCOUNTER — TELEPHONE (OUTPATIENT)
Dept: UROLOGY | Facility: CLINIC | Age: 62
End: 2022-05-31

## 2022-05-31 NOTE — TELEPHONE ENCOUNTER
Provider: DR URIBE  Caller: RENO PETERSON  Relationship to Patient: SELF    Phone Number: [phone]  Reason for Call: PT HAD URODYNAMICS STUDY DONE ON 5-10-22 AND WAS CALLING TO GET THE RESULTS  When was the patient last seen: 5-10-22

## 2022-06-02 NOTE — TELEPHONE ENCOUNTER
Caller: Nancy Richardson    Relationship to patient: Self    Best call back number: [phone]    Patient is needing: PT STATES THAT SHE WAS SUPPOSED TO HAVE A TELEPHONE VISIT WITH DR NUÑEZ ON 6/1/22 AT 5PM BUT HE NEVER CALLED. ONLY SEE APPT SCHEDULED FOR Saturday 6/11/22 AT 5PM. IS THIS CORRECT? PLEASE LET PATIENT KNOW.

## 2022-06-07 PROBLEM — N39.3 STRESS INCONTINENCE OF URINE: Status: ACTIVE | Noted: 2022-06-07

## 2022-06-13 RX ORDER — ESTRADIOL 1 MG/1
1 TABLET ORAL DAILY
Qty: 90 TABLET | Refills: 6 | OUTPATIENT
Start: 2022-06-13

## 2022-06-30 ENCOUNTER — ANESTHESIA EVENT (OUTPATIENT)
Dept: PERIOP | Facility: HOSPITAL | Age: 62
End: 2022-06-30

## 2022-06-30 ENCOUNTER — HOSPITAL ENCOUNTER (OUTPATIENT)
Facility: HOSPITAL | Age: 62
Setting detail: HOSPITAL OUTPATIENT SURGERY
Discharge: HOME | End: 2022-06-30
Attending: UROLOGY | Admitting: UROLOGY

## 2022-06-30 ENCOUNTER — ANESTHESIA (OUTPATIENT)
Dept: PERIOP | Facility: HOSPITAL | Age: 62
End: 2022-06-30

## 2022-06-30 VITALS
RESPIRATION RATE: 16 BRPM | SYSTOLIC BLOOD PRESSURE: 111 MMHG | WEIGHT: 140 LBS | TEMPERATURE: 97.2 F | HEIGHT: 62 IN | HEART RATE: 67 BPM | DIASTOLIC BLOOD PRESSURE: 66 MMHG | BODY MASS INDEX: 25.76 KG/M2 | OXYGEN SATURATION: 97 %

## 2022-06-30 DIAGNOSIS — N39.3 STRESS INCONTINENCE OF URINE: Primary | ICD-10-CM

## 2022-06-30 PROCEDURE — 25010000002 PROPOFOL 10 MG/ML EMULSION: Performed by: NURSE ANESTHETIST, CERTIFIED REGISTERED

## 2022-06-30 PROCEDURE — 25010000002 FENTANYL CITRATE (PF) 50 MCG/ML SOLUTION: Performed by: NURSE ANESTHETIST, CERTIFIED REGISTERED

## 2022-06-30 PROCEDURE — 25010000002 DEXAMETHASONE PER 1 MG: Performed by: NURSE ANESTHETIST, CERTIFIED REGISTERED

## 2022-06-30 PROCEDURE — 25010000002 ONDANSETRON PER 1 MG: Performed by: NURSE ANESTHETIST, CERTIFIED REGISTERED

## 2022-06-30 PROCEDURE — 25010000002 METOCLOPRAMIDE PER 10 MG: Performed by: NURSE ANESTHETIST, CERTIFIED REGISTERED

## 2022-06-30 PROCEDURE — 25010000002 MIDAZOLAM PER 1MG: Performed by: NURSE ANESTHETIST, CERTIFIED REGISTERED

## 2022-06-30 PROCEDURE — 51715 ENDOSCOPIC INJECTION/IMPLANT: CPT | Performed by: UROLOGY

## 2022-06-30 PROCEDURE — L8606 SYNTHETIC IMPLNT URINARY 1ML: HCPCS | Performed by: UROLOGY

## 2022-06-30 PROCEDURE — 0 CEFAZOLIN SODIUM-DEXTROSE 2-3 GM-%(50ML) RECONSTITUTED SOLUTION: Performed by: UROLOGY

## 2022-06-30 RX ORDER — ACETAMINOPHEN 500 MG
1000 TABLET ORAL EVERY 6 HOURS
Qty: 30 TABLET | Refills: 0 | Status: SHIPPED | OUTPATIENT
Start: 2022-06-30 | End: 2022-07-03

## 2022-06-30 RX ORDER — METOCLOPRAMIDE HYDROCHLORIDE 5 MG/ML
INJECTION INTRAMUSCULAR; INTRAVENOUS AS NEEDED
Status: DISCONTINUED | OUTPATIENT
Start: 2022-06-30 | End: 2022-06-30 | Stop reason: SURG

## 2022-06-30 RX ORDER — ONDANSETRON 2 MG/ML
INJECTION INTRAMUSCULAR; INTRAVENOUS AS NEEDED
Status: DISCONTINUED | OUTPATIENT
Start: 2022-06-30 | End: 2022-06-30 | Stop reason: SURG

## 2022-06-30 RX ORDER — FENTANYL CITRATE 50 UG/ML
INJECTION, SOLUTION INTRAMUSCULAR; INTRAVENOUS AS NEEDED
Status: DISCONTINUED | OUTPATIENT
Start: 2022-06-30 | End: 2022-06-30 | Stop reason: SURG

## 2022-06-30 RX ORDER — PROPOFOL 10 MG/ML
VIAL (ML) INTRAVENOUS AS NEEDED
Status: DISCONTINUED | OUTPATIENT
Start: 2022-06-30 | End: 2022-06-30 | Stop reason: SURG

## 2022-06-30 RX ORDER — MIDAZOLAM HYDROCHLORIDE 2 MG/2ML
INJECTION, SOLUTION INTRAMUSCULAR; INTRAVENOUS AS NEEDED
Status: DISCONTINUED | OUTPATIENT
Start: 2022-06-30 | End: 2022-06-30 | Stop reason: SURG

## 2022-06-30 RX ORDER — ONDANSETRON 2 MG/ML
4 INJECTION INTRAMUSCULAR; INTRAVENOUS ONCE AS NEEDED
Status: DISCONTINUED | OUTPATIENT
Start: 2022-06-30 | End: 2022-06-30 | Stop reason: HOSPADM

## 2022-06-30 RX ORDER — MEPERIDINE HYDROCHLORIDE 25 MG/ML
25 INJECTION INTRAMUSCULAR; INTRAVENOUS; SUBCUTANEOUS
Status: DISCONTINUED | OUTPATIENT
Start: 2022-06-30 | End: 2022-06-30 | Stop reason: HOSPADM

## 2022-06-30 RX ORDER — SODIUM CHLORIDE, SODIUM LACTATE, POTASSIUM CHLORIDE, CALCIUM CHLORIDE 600; 310; 30; 20 MG/100ML; MG/100ML; MG/100ML; MG/100ML
1000 INJECTION, SOLUTION INTRAVENOUS CONTINUOUS
Status: DISCONTINUED | OUTPATIENT
Start: 2022-06-30 | End: 2022-06-30 | Stop reason: HOSPADM

## 2022-06-30 RX ORDER — LIDOCAINE HYDROCHLORIDE 20 MG/ML
INJECTION, SOLUTION INTRAVENOUS AS NEEDED
Status: DISCONTINUED | OUTPATIENT
Start: 2022-06-30 | End: 2022-06-30 | Stop reason: SURG

## 2022-06-30 RX ORDER — DEXAMETHASONE SODIUM PHOSPHATE 4 MG/ML
INJECTION, SOLUTION INTRA-ARTICULAR; INTRALESIONAL; INTRAMUSCULAR; INTRAVENOUS; SOFT TISSUE AS NEEDED
Status: DISCONTINUED | OUTPATIENT
Start: 2022-06-30 | End: 2022-06-30 | Stop reason: SURG

## 2022-06-30 RX ORDER — PHENAZOPYRIDINE HYDROCHLORIDE 100 MG/1
100 TABLET, FILM COATED ORAL 3 TIMES DAILY PRN
Qty: 21 TABLET | Refills: 0 | Status: SHIPPED | OUTPATIENT
Start: 2022-06-30 | End: 2022-07-19

## 2022-06-30 RX ORDER — KETAMINE HYDROCHLORIDE 50 MG/ML
INJECTION, SOLUTION, CONCENTRATE INTRAMUSCULAR; INTRAVENOUS AS NEEDED
Status: DISCONTINUED | OUTPATIENT
Start: 2022-06-30 | End: 2022-06-30 | Stop reason: SURG

## 2022-06-30 RX ORDER — CEFAZOLIN SODIUM 2 G/50ML
2 SOLUTION INTRAVENOUS
Status: COMPLETED | OUTPATIENT
Start: 2022-06-30 | End: 2022-06-30

## 2022-06-30 RX ADMIN — METOCLOPRAMIDE 10 MG: 5 INJECTION, SOLUTION INTRAMUSCULAR; INTRAVENOUS at 07:33

## 2022-06-30 RX ADMIN — DEXAMETHASONE SODIUM PHOSPHATE 8 MG: 4 INJECTION, SOLUTION INTRAMUSCULAR; INTRAVENOUS at 07:33

## 2022-06-30 RX ADMIN — LIDOCAINE HYDROCHLORIDE 100 MG: 20 INJECTION, SOLUTION INTRAVENOUS at 07:33

## 2022-06-30 RX ADMIN — GLYCOPYRROLATE 0.2 MCG: 0.2 INJECTION, SOLUTION INTRAMUSCULAR; INTRAVITREAL at 07:33

## 2022-06-30 RX ADMIN — MIDAZOLAM HYDROCHLORIDE 2 MG: 1 INJECTION, SOLUTION INTRAMUSCULAR; INTRAVENOUS at 07:28

## 2022-06-30 RX ADMIN — PROPOFOL 300 MG: 10 INJECTION, EMULSION INTRAVENOUS at 07:33

## 2022-06-30 RX ADMIN — CEFAZOLIN SODIUM 2 G: 2 SOLUTION INTRAVENOUS at 07:35

## 2022-06-30 RX ADMIN — SODIUM CHLORIDE, POTASSIUM CHLORIDE, SODIUM LACTATE AND CALCIUM CHLORIDE 1000 ML: 600; 310; 30; 20 INJECTION, SOLUTION INTRAVENOUS at 06:30

## 2022-06-30 RX ADMIN — KETAMINE HYDROCHLORIDE 25 MG: 50 INJECTION, SOLUTION INTRAMUSCULAR; INTRAVENOUS at 07:33

## 2022-06-30 RX ADMIN — ONDANSETRON 4 MG: 2 INJECTION INTRAMUSCULAR; INTRAVENOUS at 07:33

## 2022-06-30 RX ADMIN — FENTANYL CITRATE 100 MCG: 50 INJECTION INTRAMUSCULAR; INTRAVENOUS at 07:33

## 2022-06-30 NOTE — ANESTHESIA POSTPROCEDURE EVALUATION
How Severe Is Your Skin Lesion?: mild Patient: Nancy Richardson    Procedure Summary     Date: 06/30/22 Room / Location: [facility] /  [place] [facility]    Anesthesia Start: 0728 Anesthesia Stop: 0810    Procedure: CYSTOSCOPY WITH BULKING AGENT INJECTION (N/A ) Diagnosis:       Stress incontinence of urine      (Stress incontinence of urine [N39.3])    Surgeons: Salo Reyes MD Provider: Meet Krishnamurthy CRNA    Anesthesia Type: MAC ASA Status: 2          Anesthesia Type: MAC    Vitals  Vitals Value Taken Time   /66 06/30/22 0845   Temp 97.2 °F (36.2 °C) 06/30/22 0815   Pulse 67 06/30/22 0845   Resp 16 06/30/22 0845   SpO2 97 % 06/30/22 0845           Post Anesthesia Care and Evaluation    Patient location during evaluation: PHASE II  Patient participation: complete - patient participated  Level of consciousness: awake  Pain score: 1  Pain management: adequate    Airway patency: patent  Anesthetic complications: No anesthetic complications  PONV Status: controlled  Cardiovascular status: acceptable and stable  Respiratory status: acceptable  Hydration status: acceptable       Have Your Skin Lesions Been Treated?: not been treated Is This A New Presentation, Or A Follow-Up?: Skin Lesion

## 2022-06-30 NOTE — ANESTHESIA PREPROCEDURE EVALUATION
Anesthesia Evaluation     Patient summary reviewed and Nursing notes reviewed   no history of anesthetic complications:  NPO Solid Status: > 8 hours  NPO Liquid Status: > 8 hours           Airway   Mallampati: II  TM distance: >3 FB  Neck ROM: full  no difficulty expected  Dental - normal exam     Pulmonary - negative pulmonary ROS and normal exam   Cardiovascular - normal exam    Rhythm: regular  Rate: normal    (+) valvular problems/murmurs murmur, hyperlipidemia,       Neuro/Psych  (+) psychiatric history Anxiety and Depression,    GI/Hepatic/Renal/Endo    (+)  GERD well controlled,      Musculoskeletal (-) negative ROS    Abdominal    Substance History - negative use     OB/GYN negative ob/gyn ROS         Other - negative ROS                       Anesthesia Plan    ASA 2     MAC     (Pt told that intravenous sedation will be used as the primary anesthetic along with local anesthesia if necessary. Every effort will be made to make sure the patient is comfortable.     The patient was told they may or may not have recall for the procedure. It was further explained that if the MAC was not adequate that a general anesthetic with either an LMA or endotracheal tube would be required.     Will proceed with the plan of care.)  intravenous induction     Anesthetic plan, risks, benefits, and alternatives have been provided, discussed and informed consent has been obtained with: patient.        CODE STATUS:

## 2022-07-01 ENCOUNTER — TELEPHONE (OUTPATIENT)
Dept: UROLOGY | Facility: CLINIC | Age: 62
End: 2022-07-01

## 2022-07-01 NOTE — TELEPHONE ENCOUNTER
"Caller: RENO PETERSON    Relationship to patient: SELF    Best call back number: [phone]    Chief complaint: Stress urinary incontinence    Type of visit: FOLLOW UP    Requested date: 7/19/22     If rescheduling, when is the original appointment: 7/14/22     Additional notes: CYSTOSCOPY WITH BULKING AGENT INJECTION 6/30/22. PER OP NOTE \"She will follow-up with me in 2 weeks for a postvoid residual.\"    PT STATES SHE WILL BE OUT OF TOWN ON 7/14/22, SHE WILL BE BACK IN TOWN ON 7/18/22 AND HAS ANOTHER APPT IN [place] ON 7/19/22 IN EVENING AND WOULD LIKE TO RESCHEDULE TO 7/19/22 IF POSSIBLE.     NEXT AVAILABLE SHOWS AS 7/25/22 AND HUB UNABLE TO WARM TRANSFER.  "

## 2022-07-19 ENCOUNTER — OFFICE VISIT (OUTPATIENT)
Dept: UROLOGY | Facility: CLINIC | Age: 62
End: 2022-07-19

## 2022-07-19 VITALS
DIASTOLIC BLOOD PRESSURE: 83 MMHG | WEIGHT: 140 LBS | HEART RATE: 59 BPM | TEMPERATURE: 97.3 F | OXYGEN SATURATION: 97 % | SYSTOLIC BLOOD PRESSURE: 132 MMHG | BODY MASS INDEX: 25.76 KG/M2 | HEIGHT: 62 IN

## 2022-07-19 DIAGNOSIS — N39.3 STRESS INCONTINENCE OF URINE: Primary | ICD-10-CM

## 2022-07-19 DIAGNOSIS — Z48.816 ENCOUNTER FOR SURGICAL AFTERCARE FOLLOWING SURGERY ON THE GENITOURINARY SYSTEM: ICD-10-CM

## 2022-07-19 LAB
BILIRUB BLD-MCNC: NEGATIVE MG/DL
CLARITY, POC: CLEAR
COLOR UR: YELLOW
EXPIRATION DATE: NORMAL
GLUCOSE UR STRIP-MCNC: NEGATIVE MG/DL
KETONES UR QL: NEGATIVE
LEUKOCYTE EST, POC: NEGATIVE
Lab: NORMAL
NITRITE UR-MCNC: NEGATIVE MG/ML
PH UR: 6.5 [PH] (ref 5–8)
PROT UR STRIP-MCNC: NEGATIVE MG/DL
RBC # UR STRIP: NEGATIVE /UL
SP GR UR: 1 (ref 1–1.03)
UROBILINOGEN UR QL: NORMAL

## 2022-07-19 PROCEDURE — 81003 URINALYSIS AUTO W/O SCOPE: CPT | Performed by: PHYSICIAN ASSISTANT

## 2022-07-19 PROCEDURE — 51798 US URINE CAPACITY MEASURE: CPT | Performed by: PHYSICIAN ASSISTANT

## 2022-07-19 PROCEDURE — 99213 OFFICE O/P EST LOW 20 MIN: CPT | Performed by: PHYSICIAN ASSISTANT

## 2022-07-19 NOTE — PROGRESS NOTES
"Chief Complaint   Patient presents with   • Follow-up     2 week f/u with pvr        HPI  Ms. Richardson is a 61 y.o. female with history of ISD who underwent cystoscopy with Bulkamid placement .    At this visit, has had great results with Bulkamid. No leakage at all. Did jumping jacks this morning, has been coughing and sneezing, no leakage.  She denies any hematuria or dysuria and is very pleased so far.      Past Medical History:   Diagnosis Date   • Depression    • Ear piercing    • Elevated cholesterol    • Fracture of left clavicle    • GERD (gastroesophageal reflux disease)    • Heart murmur    • History of stress test    • Hx of echocardiogram    • Seasonal allergies    • Tattoo    • Urinary incontinence        Past Surgical History:   Procedure Laterality Date   • ADENOIDECTOMY     •  SECTION      x2   • CYSTOSCOPY W/ BULKING AGENT INJECTION N/A 2022    Procedure: CYSTOSCOPY WITH BULKING AGENT INJECTION;  Surgeon: Salo Reyes MD;  Location: [facility];  Service: Urology;  Laterality: N/A;   • HYSTERECTOMY     • KNEE ACL RECONSTRUCTION     • OOPHORECTOMY     • TONSILLECTOMY           Current Outpatient Medications:   •  estradiol (ESTRACE) 0.1 MG/GM vaginal cream, Insert 2 g into the vagina 3 (Three) Times a Week., Disp: 42.5 g, Rfl: 12  •  ezetimibe (ZETIA) 10 MG tablet, , Disp: , Rfl: 0  •  fluticasone (FLONASE) 50 MCG/ACT nasal spray, SHAKE LIQUID AND USE 1 TO 2 SPRAYS IN EACH NOSTRIL EVERY DAY AS NEEDED, Disp: , Rfl:   •  HAVRIX 1440 EL U/ML vaccine, inject 1 milliliter intramuscularly, Disp: , Rfl: 0  •  RA ALLERGY RELIEF 180 MG tablet, , Disp: , Rfl: 0  •  SUMAtriptan (IMITREX) 100 MG tablet, , Disp: , Rfl: 0     Physical Exam  Visit Vitals  /83   Pulse 59   Temp 97.3 °F (36.3 °C)   Ht 157.5 cm (62\")   Wt 63.5 kg (140 lb)   SpO2 97%   BMI 25.61 kg/m²       Labs  Brief Urine Lab Results  (Last result in the past 365 days)      Color   Clarity   Blood   Leuk Est   Nitrite "   Protein   CREAT   Urine HCG        07/19/22 1356 Yellow   Clear   Negative   Negative   Negative   Negative                 Lab Results   Component Value Date    GLUCOSE 96 12/07/2015    CALCIUM 9.7 12/07/2015     12/07/2015    K 4.6 12/07/2015    CO2 29 12/07/2015     12/07/2015    BUN 14 12/07/2015    CREATININE 0.7 12/07/2015    ANIONGAP 14 12/07/2015       Lab Results   Component Value Date    WBC 5.7 12/07/2015    HGB 12.8 12/07/2015    HCT 39 12/07/2015    MCV 91.6 12/07/2015     12/07/2015     PVR  Post-void residual performed with ultrasound scanner by staff and interpreted by me -8 cc       Assessment  61 y.o. female with history of ISD s/p bulking, repeat bulking with Bulkamid was performed on June 30.  Her PVR was extremely scant at 8 cc.  Her urine is benign and she is pleased with her results.  She has no other active urological conditions and appreciates the offer to follow-up as needed.    Plan  1.  Follow-up as needed

## 2022-07-28 ENCOUNTER — TRANSCRIBE ORDERS (OUTPATIENT)
Dept: ADMINISTRATIVE | Facility: HOSPITAL | Age: 62
End: 2022-07-28

## 2022-07-28 DIAGNOSIS — Z12.31 ENCOUNTER FOR SCREENING MAMMOGRAM FOR MALIGNANT NEOPLASM OF BREAST: Primary | ICD-10-CM

## 2022-10-06 ENCOUNTER — HOSPITAL ENCOUNTER (OUTPATIENT)
Dept: MAMMOGRAPHY | Facility: HOSPITAL | Age: 62
Discharge: HOME | End: 2022-10-06
Admitting: NURSE PRACTITIONER

## 2022-10-06 DIAGNOSIS — Z12.31 ENCOUNTER FOR SCREENING MAMMOGRAM FOR MALIGNANT NEOPLASM OF BREAST: ICD-10-CM

## 2022-10-06 PROCEDURE — 77063 BREAST TOMOSYNTHESIS BI: CPT

## 2022-10-06 PROCEDURE — 77067 SCR MAMMO BI INCL CAD: CPT

## 2022-11-07 DIAGNOSIS — R32 URINARY INCONTINENCE, UNSPECIFIED TYPE: ICD-10-CM

## 2022-11-07 RX ORDER — ESTRADIOL 0.1 MG/G
2 CREAM VAGINAL 3 TIMES WEEKLY
Qty: 42.5 G | Refills: 12 | Status: SHIPPED | OUTPATIENT
Start: 2022-11-07

## 2023-11-20 DIAGNOSIS — R32 URINARY INCONTINENCE, UNSPECIFIED TYPE: ICD-10-CM

## 2023-11-20 RX ORDER — ESTRADIOL 0.1 MG/G
CREAM VAGINAL
Qty: 42.5 G | Refills: 12 | OUTPATIENT
Start: 2023-11-20

## 2024-05-30 ENCOUNTER — TRANSCRIBE ORDERS (OUTPATIENT)
Dept: ADMINISTRATIVE | Facility: HOSPITAL | Age: 64
End: 2024-05-30
Payer: COMMERCIAL

## 2024-05-30 DIAGNOSIS — Z12.31 SCREENING MAMMOGRAM FOR BREAST CANCER: Primary | ICD-10-CM

## 2024-07-11 ENCOUNTER — OFFICE VISIT (OUTPATIENT)
Dept: OBSTETRICS AND GYNECOLOGY | Facility: CLINIC | Age: 64
End: 2024-07-11
Payer: COMMERCIAL

## 2024-07-11 VITALS
WEIGHT: 140 LBS | HEIGHT: 64 IN | BODY MASS INDEX: 23.9 KG/M2 | SYSTOLIC BLOOD PRESSURE: 116 MMHG | DIASTOLIC BLOOD PRESSURE: 60 MMHG

## 2024-07-11 DIAGNOSIS — Z12.31 ENCOUNTER FOR SCREENING MAMMOGRAM FOR BREAST CANCER: ICD-10-CM

## 2024-07-11 DIAGNOSIS — Z01.419 ENCOUNTER FOR GYNECOLOGICAL EXAMINATION (GENERAL) (ROUTINE) WITHOUT ABNORMAL FINDINGS: ICD-10-CM

## 2024-07-11 DIAGNOSIS — Z12.11 SCREENING FOR COLON CANCER: ICD-10-CM

## 2024-07-11 DIAGNOSIS — R32 URINARY INCONTINENCE, UNSPECIFIED TYPE: Primary | ICD-10-CM

## 2024-07-11 RX ORDER — FLUOXETINE HYDROCHLORIDE 60 MG/1
60 TABLET, FILM COATED ORAL; ORAL DAILY
COMMUNITY

## 2024-07-11 RX ORDER — OMEPRAZOLE 20 MG/1
20 CAPSULE, DELAYED RELEASE ORAL DAILY
COMMUNITY

## 2024-07-11 RX ORDER — ESTRADIOL 1 MG/1
0.5 TABLET ORAL DAILY
Qty: 30 TABLET | Refills: 12 | Status: SHIPPED | OUTPATIENT
Start: 2024-07-11

## 2024-07-11 RX ORDER — ESTRADIOL 0.1 MG/G
2 CREAM VAGINAL 3 TIMES WEEKLY
Qty: 42.5 G | Refills: 12 | Status: CANCELLED | OUTPATIENT
Start: 2024-07-12

## 2024-07-11 NOTE — PROGRESS NOTES
Subjective   Chief Complaint   Patient presents with    Gynecologic Exam     Nancy Richardson is a 63 y.o. year old .  No LMP recorded. Patient has had a hysterectomy.  She presents to be seen because of annual exam.  Prior hysterectomy complete.  Patient originally on estradiol 1 mg was taken off by urology 3 years ago.  She has had bulking agents for incontinence done x 2.  She still having significant vasomotor symptoms and does not like the vaginal estrogen-discussed this will not combat vasomotor symptoms..     OTHER COMPLAINTS:  Nothing else    The following portions of the patient's history were reviewed and updated as appropriate:She  has a past medical history of Depression, Ear piercing, Elevated cholesterol, Fracture of left clavicle, GERD (gastroesophageal reflux disease), Heart murmur, History of stress test, echocardiogram, Migraine, Ovarian cyst, PMS (premenstrual syndrome), Seasonal allergies, Tattoo, Urinary incontinence, and Varicella.  She does not have any pertinent problems on file.  She  has a past surgical history that includes Oophorectomy;  section; Anterior cruciate ligament repair; Tonsillectomy; Adenoidectomy; Hysterectomy; Cystoscopy w/ deflux injection (N/A, 2022); laparoscopic assisted vaginal hysterectomy salpingo oophorectomy; and [place] tooth extraction.  Her family history includes Breast cancer in her maternal aunt, maternal aunt, mother, and sister; Diabetes in her father; Hypertension in her father, mother, and sister; Prostate cancer in her father.  She  reports that she quit smoking about 35 years ago. Her smoking use included cigarettes. She started smoking about 48 years ago. She has a 6.2 pack-year smoking history. She has never used smokeless tobacco. She reports current alcohol use of about 2.0 standard drinks of alcohol per week. She reports that she does not use drugs.  Current Outpatient Medications   Medication Sig Dispense Refill     estradiol (ESTRACE) 0.1 MG/GM vaginal cream Insert 2 g into the vagina 3 (Three) Times a Week. 42.5 g 12    ezetimibe (ZETIA) 10 MG tablet   0    FLUoxetine (PROzac) 60 MG tablet Take 1 tablet by mouth Daily.      fluticasone (FLONASE) 50 MCG/ACT nasal spray SHAKE LIQUID AND USE 1 TO 2 SPRAYS IN EACH NOSTRIL EVERY DAY AS NEEDED      HAVRIX 1440 EL U/ML vaccine inject 1 milliliter intramuscularly  0    omeprazole (priLOSEC) 20 MG capsule Take 1 capsule by mouth Daily.      RA ALLERGY RELIEF 180 MG tablet   0    SUMAtriptan (IMITREX) 100 MG tablet   0     No current facility-administered medications for this visit.     Current Outpatient Medications on File Prior to Visit   Medication Sig    estradiol (ESTRACE) 0.1 MG/GM vaginal cream Insert 2 g into the vagina 3 (Three) Times a Week.    ezetimibe (ZETIA) 10 MG tablet     FLUoxetine (PROzac) 60 MG tablet Take 1 tablet by mouth Daily.    fluticasone (FLONASE) 50 MCG/ACT nasal spray SHAKE LIQUID AND USE 1 TO 2 SPRAYS IN EACH NOSTRIL EVERY DAY AS NEEDED    HAVRIX 1440 EL U/ML vaccine inject 1 milliliter intramuscularly    omeprazole (priLOSEC) 20 MG capsule Take 1 capsule by mouth Daily.    RA ALLERGY RELIEF 180 MG tablet     SUMAtriptan (IMITREX) 100 MG tablet      No current facility-administered medications on file prior to visit.     She is allergic to sulfa antibiotics.    Social History    Tobacco Use      Smoking status: Former        Packs/day: 0.00        Years: 0.5 packs/day for 12.4 years (6.2 ttl pk-yrs)        Types: Cigarettes        Start date: 1976        Quit date: 10/12/1988        Years since quittin.7      Smokeless tobacco: Never    Review of Systems  Consitutional POS: nothing reported    NEG: anorexia or night sweats   Gastointestinal POS: nothing reported    NEG: bloating, change in bowel habits, melena, or reflux symptoms   Genitourinary POS: nothing reported    NEG: dysuria or hematuria   Integument POS: nothing reported    NEG: moles  "that are changing in size, shape, color or rashes   Breast POS: nothing reported    NEG: persistent breast lump, skin dimpling, or nipple discharge         Respiratory: negative  Cardiovascular: negative  GYN:   See HPI          Objective   /60   Ht 162.6 cm (64\")   Wt 63.5 kg (140 lb)   BMI 24.03 kg/m²     General:  well developed; well nourished  no acute distress   Skin:  No suspicious lesions seen   Thyroid: normal to inspection and palpation   Lungs:  breathing is unlabored  clear to auscultation bilaterally   Heart:  regular rate and rhythm, S1, S2 normal, no murmur, click, rub or gallop   Breasts:  Examined in supine position  Symmetric without masses or skin dimpling  Nipples normal without inversion, lesions or discharge  There are no palpable axillary nodes   Abdomen: soft, non-tender; no masses  no umbilical or inguinal hernias are present  no hepato-splenomegaly   Pelvis: Clinical staff was present for exam  External genitalia:  normal appearance of the external genitalia including [place]'s and [place]'s glands.  :  urethral meatus normal;  Vaginal:  normal pink mucosa without prolapse or lesions.  Cervix:  absent.  Uterus:  absent.  Adnexa:  absent, bilateral.  Rectal:  digital rectal exam not performed; anus visually normal appearing.     Psychiatric: Alert and oriented ×3, mood and affect appropriate  HEENT: Atraumatic, normocephalic, normal scleral icterus  Extremities: 2+ pulses bilaterally, no edema      Lab Review   PAP    Imaging   Mammogram report        Assessment   Physical exam within normal limits  Significant vasomotor symptoms: Patient does have a family history of sister and mother with breast cancer-BRCA testing was negative.  Risks benefits and alternatives were discussed at length.  She desires to reinitiate HRT.     Plan   Pap smear done.  Start estradiol 0.5 mg-half a tablet-prescription sent in.  Cologuard ordered.  Diet and exercise discussed.  Mammogram is set up and " told patient she needs to be getting these every year      No orders of the defined types were placed in this encounter.         This note was electronically signed.      July 11, 2024

## 2024-07-16 LAB — REF LAB TEST METHOD: NORMAL

## 2024-07-31 ENCOUNTER — APPOINTMENT (OUTPATIENT)
Dept: GENERAL RADIOLOGY | Facility: HOSPITAL | Age: 64
End: 2024-07-31
Payer: COMMERCIAL

## 2024-07-31 ENCOUNTER — HOSPITAL ENCOUNTER (EMERGENCY)
Facility: HOSPITAL | Age: 64
Discharge: HOME | End: 2024-07-31
Attending: EMERGENCY MEDICINE
Payer: COMMERCIAL

## 2024-07-31 VITALS
HEART RATE: 51 BPM | WEIGHT: 138 LBS | BODY MASS INDEX: 25.4 KG/M2 | RESPIRATION RATE: 21 BRPM | OXYGEN SATURATION: 98 % | HEIGHT: 62 IN | TEMPERATURE: 97.8 F | DIASTOLIC BLOOD PRESSURE: 69 MMHG | SYSTOLIC BLOOD PRESSURE: 121 MMHG

## 2024-07-31 DIAGNOSIS — R00.1 SINUS BRADYCARDIA: ICD-10-CM

## 2024-07-31 DIAGNOSIS — R00.2 PALPITATIONS: Primary | ICD-10-CM

## 2024-07-31 DIAGNOSIS — I49.3 PVC (PREMATURE VENTRICULAR CONTRACTION): ICD-10-CM

## 2024-07-31 LAB
ALBUMIN SERPL-MCNC: 4.8 G/DL (ref 3.5–5.2)
ALBUMIN/GLOB SERPL: 1.5 G/DL
ALP SERPL-CCNC: 111 U/L (ref 39–117)
ALT SERPL W P-5'-P-CCNC: 44 U/L (ref 1–33)
ANION GAP SERPL CALCULATED.3IONS-SCNC: 12.7 MMOL/L (ref 5–15)
AST SERPL-CCNC: 35 U/L (ref 1–32)
BASOPHILS # BLD AUTO: 0.04 10*3/MM3 (ref 0–0.2)
BASOPHILS NFR BLD AUTO: 0.4 % (ref 0–1.5)
BILIRUB SERPL-MCNC: 0.2 MG/DL (ref 0–1.2)
BUN SERPL-MCNC: 21 MG/DL (ref 8–23)
BUN/CREAT SERPL: 24.4 (ref 7–25)
CALCIUM SPEC-SCNC: 10.1 MG/DL (ref 8.6–10.5)
CHLORIDE SERPL-SCNC: 99 MMOL/L (ref 98–107)
CO2 SERPL-SCNC: 27.3 MMOL/L (ref 22–29)
CREAT SERPL-MCNC: 0.86 MG/DL (ref 0.57–1)
D DIMER PPP FEU-MCNC: 0.53 MCGFEU/ML (ref 0–0.63)
DEPRECATED RDW RBC AUTO: 49.1 FL (ref 37–54)
EGFRCR SERPLBLD CKD-EPI 2021: 76 ML/MIN/1.73
EOSINOPHIL # BLD AUTO: 0.08 10*3/MM3 (ref 0–0.4)
EOSINOPHIL NFR BLD AUTO: 0.9 % (ref 0.3–6.2)
ERYTHROCYTE [DISTWIDTH] IN BLOOD BY AUTOMATED COUNT: 14.8 % (ref 12.3–15.4)
GLOBULIN UR ELPH-MCNC: 3.1 GM/DL
GLUCOSE SERPL-MCNC: 100 MG/DL (ref 65–99)
HCT VFR BLD AUTO: 42.3 % (ref 34–46.6)
HGB BLD-MCNC: 13.8 G/DL (ref 12–15.9)
HOLD SPECIMEN: NORMAL
HOLD SPECIMEN: NORMAL
IMM GRANULOCYTES # BLD AUTO: 0.02 10*3/MM3 (ref 0–0.05)
IMM GRANULOCYTES NFR BLD AUTO: 0.2 % (ref 0–0.5)
LYMPHOCYTES # BLD AUTO: 1.93 10*3/MM3 (ref 0.7–3.1)
LYMPHOCYTES NFR BLD AUTO: 21.4 % (ref 19.6–45.3)
MAGNESIUM SERPL-MCNC: 2.1 MG/DL (ref 1.6–2.4)
MCH RBC QN AUTO: 29.3 PG (ref 26.6–33)
MCHC RBC AUTO-ENTMCNC: 32.6 G/DL (ref 31.5–35.7)
MCV RBC AUTO: 89.8 FL (ref 79–97)
MONOCYTES # BLD AUTO: 0.57 10*3/MM3 (ref 0.1–0.9)
MONOCYTES NFR BLD AUTO: 6.3 % (ref 5–12)
NEUTROPHILS NFR BLD AUTO: 6.38 10*3/MM3 (ref 1.7–7)
NEUTROPHILS NFR BLD AUTO: 70.8 % (ref 42.7–76)
NRBC BLD AUTO-RTO: 0 /100 WBC (ref 0–0.2)
PLATELET # BLD AUTO: 308 10*3/MM3 (ref 140–450)
PMV BLD AUTO: 9.3 FL (ref 6–12)
POTASSIUM SERPL-SCNC: 3.7 MMOL/L (ref 3.5–5.2)
PROT SERPL-MCNC: 7.9 G/DL (ref 6–8.5)
RBC # BLD AUTO: 4.71 10*6/MM3 (ref 3.77–5.28)
SODIUM SERPL-SCNC: 139 MMOL/L (ref 136–145)
TROPONIN T SERPL HS-MCNC: 7 NG/L
TSH SERPL DL<=0.05 MIU/L-ACNC: 1.95 UIU/ML (ref 0.27–4.2)
WBC NRBC COR # BLD AUTO: 9.02 10*3/MM3 (ref 3.4–10.8)
WHOLE BLOOD HOLD COAG: NORMAL
WHOLE BLOOD HOLD SPECIMEN: NORMAL

## 2024-07-31 PROCEDURE — 80050 GENERAL HEALTH PANEL: CPT | Performed by: EMERGENCY MEDICINE

## 2024-07-31 PROCEDURE — 85379 FIBRIN DEGRADATION QUANT: CPT | Performed by: PHYSICIAN ASSISTANT

## 2024-07-31 PROCEDURE — 71045 X-RAY EXAM CHEST 1 VIEW: CPT

## 2024-07-31 PROCEDURE — 84484 ASSAY OF TROPONIN QUANT: CPT | Performed by: EMERGENCY MEDICINE

## 2024-07-31 PROCEDURE — 36415 COLL VENOUS BLD VENIPUNCTURE: CPT

## 2024-07-31 PROCEDURE — 93005 ELECTROCARDIOGRAM TRACING: CPT | Performed by: EMERGENCY MEDICINE

## 2024-07-31 PROCEDURE — 83735 ASSAY OF MAGNESIUM: CPT | Performed by: EMERGENCY MEDICINE

## 2024-07-31 PROCEDURE — 99284 EMERGENCY DEPT VISIT MOD MDM: CPT

## 2024-07-31 RX ORDER — SODIUM CHLORIDE 0.9 % (FLUSH) 0.9 %
10 SYRINGE (ML) INJECTION AS NEEDED
Status: DISCONTINUED | OUTPATIENT
Start: 2024-07-31 | End: 2024-07-31 | Stop reason: HOSPADM

## 2024-07-31 NOTE — ED PROVIDER NOTES
"Subjective   History of Present Illness  Ms. Richardson is a pleasant 63-year-old female with history of hyperlipidemia, anxiety and a heart murmur who presents to the emergency department with complaints of intermittent \"palpitations\" for the past 5 days.  The patient states that she has also had some mild chest \"tightness\" off-and-on.  Nothing seems to exacerbate or alleviate the symptoms.  She has no associated diaphoresis, shortness of breath, nausea or vomiting.  She states that she has been under increased stress recently as her father is in the hospice and she has been receiving a lot of calls about his care.  She states that she drinks 1 caffeinated drink daily.  No over-the-counter decongestants.  No other stimulants.  She does note that she recently drove to [place] and back.  No history of DVT.  No leg pain or swelling.  No associated shortness of breath.      Review of Systems   Constitutional:  Negative for chills and fever.   HENT:  Negative for sore throat.    Respiratory:  Positive for chest tightness. Negative for cough and shortness of breath.    Cardiovascular:  Positive for palpitations. Negative for chest pain.   Gastrointestinal:  Negative for abdominal pain, diarrhea, nausea and vomiting.   Genitourinary:  Negative for dysuria.   Musculoskeletal:  Negative for back pain.   Skin:  Negative for pallor.   Neurological:  Negative for dizziness, weakness, light-headedness and headaches.   Hematological: Negative.    Psychiatric/Behavioral:          Recent increased stress and anxiety over her father's illness.       Past Medical History:   Diagnosis Date    Depression     Ear piercing     Elevated cholesterol     Fracture of left clavicle     GERD (gastroesophageal reflux disease)     Heart murmur     History of stress test     Hx of echocardiogram     Migraine     Ovarian cyst     PMS (premenstrual syndrome)     Seasonal allergies     Tattoo     Urinary incontinence     Varicella        Allergies "   Allergen Reactions    Sulfa Antibiotics Anaphylaxis       Past Surgical History:   Procedure Laterality Date    ADENOIDECTOMY       SECTION      x2    CYSTOSCOPY W/ BULKING AGENT INJECTION N/A 2022    Procedure: CYSTOSCOPY WITH BULKING AGENT INJECTION;  Surgeon: Salo Reyes MD;  Location: [facility];  Service: Urology;  Laterality: N/A;    HYSTERECTOMY      KNEE ACL RECONSTRUCTION      LAPAROSCOPIC ASSISTED VAGINAL HYSTERECTOMY SALPINGO OOPHORECTOMY      OOPHORECTOMY      TONSILLECTOMY      WISDOM TOOTH EXTRACTION         Family History   Problem Relation Age of Onset    Breast cancer Mother     Hypertension Mother     Breast cancer Sister     Hypertension Sister     Breast cancer Maternal Aunt     Prostate cancer Father     Diabetes Father     Hypertension Father     Breast cancer Maternal Aunt        Social History     Socioeconomic History    Marital status:    Tobacco Use    Smoking status: Former     Current packs/day: 0.00     Average packs/day: 0.5 packs/day for 12.4 years (6.2 ttl pk-yrs)     Types: Cigarettes     Start date: 1976     Quit date: 10/12/1988     Years since quittin.8    Smokeless tobacco: Never   Vaping Use    Vaping status: Never Used   Substance and Sexual Activity    Alcohol use: Yes     Alcohol/week: 2.0 standard drinks of alcohol     Types: 2 Glasses of wine per week    Drug use: No    Sexual activity: Yes     Partners: Male     Birth control/protection: Post-menopausal           Objective   Physical Exam  Constitutional:       General: She is not in acute distress.     Appearance: Normal appearance. She is not ill-appearing, toxic-appearing or diaphoretic.   HENT:      Head: Normocephalic.      Nose: Nose normal.      Mouth/Throat:      Mouth: Mucous membranes are moist.   Eyes:      General: No scleral icterus.     Conjunctiva/sclera: Conjunctivae normal.      Pupils: Pupils are equal, round, and reactive to light.   Cardiovascular:      Rate and  "Rhythm: Normal rate.      Pulses: Normal pulses.      Heart sounds: Normal heart sounds.   Pulmonary:      Effort: Pulmonary effort is normal.      Breath sounds: Normal breath sounds.   Abdominal:      Tenderness: There is no abdominal tenderness.   Musculoskeletal:      Cervical back: Neck supple.      Right lower leg: No edema.      Left lower leg: No edema.   Skin:     General: Skin is warm and dry.      Coloration: Skin is not pale.   Neurological:      Mental Status: She is alert and oriented to person, place, and time.   Psychiatric:         Mood and Affect: Mood normal.         Procedures           ED Course      In summary, 63-year-old female with history of hyperlipidemia, heart murmur and mild anxiety, presents to the emergency department with a 5-day history of \"palpitations\".  No associated chest pain or shortness of breath.  Nothing seems to exacerbate or alleviate the symptoms.  She describes the palpitations as a \"skipping beat\".  Moderate caffeine use.  No other stimulants.  She did have a recent drive to [place] and back.  No leg pain or swelling.  No history of DVT or PE.    MDM: Differential includes arrhythmia such as PVCs, atrial fibrillation, etc., electrolyte abnormality, anxiety, thyroid disorder, doubt ACS, etc.    While talking with the patient, she was noted to be in normal sinus rhythm with a rate in the low 60s.  She abruptly reported that she experienced one of the palpitations and I looked up to see a single PVC on the monitor.  She concurs that these are the palpitations that she is experiencing.    EKG reviewed by me shows sinus rhythm with a rate of 58.  No ectopy or other arrhythmia noted.  No ischemic changes noted.    Labs are bland.  High-sensitivity troponin is normal at 7.  TSH is 1.95.  Normal chemistries.  Magnesium is normal at 2.1.  Normal creatinine at 0.86.  LFTs are mildly elevated and nonspecific.    Chest x-ray reviewed by me shows no active disease.  Awaiting " radiology read.      Chest xray shows no acute cardiopulmonary abnormalities as read by radiology.    Pt observed on telemetry with very occasional unifocal PVCs and mostly sinus bradycardia with rate in the upper 50s.      I spoke with the pt about her PVCs.  I feel this is largely a benign finding, perhaps brought on by recent increased stress.  I will have her limit caffeine and other stimulants and recommend f/u for possible Holter monitoring.                                                   Medical Decision Making  Amount and/or Complexity of Data Reviewed  Labs: ordered.  Radiology: ordered.  ECG/medicine tests: ordered.    Risk  Prescription drug management.        Final diagnoses:   Palpitations   PVC (premature ventricular contraction)   Sinus bradycardia       ED Disposition  ED Disposition       ED Disposition   Discharge    Condition   Stable    Comment   --               Qamar Haynes MD  [address] [facility] [id number] [phone]      call for follow up    Gilberto Sheikh MD  [address] [place] [address]  [address] [address]  [phone]      call for follow up    [facility] EMERGENCY DEPARTMENT  [address] [facility] [address] [phone]    If symptoms worsen    Gilberto Sheikh MD  [address] [place] [address]  [address]  [facility] [id number] [phone]               Medication List      No changes were made to your prescriptions during this visit.            Pako Ochoa PA  07/31/24 1819

## 2024-07-31 NOTE — DISCHARGE INSTRUCTIONS
Rest.  Avoid stimulants such as caffeine, decongestant, energy drinks, etc.  Get plenty of rest.  Call your PCP for follow up.  Call Dr. Sheikh (cardiology) for follow up for possible Holter monitoring.  Return to the ER if you have pain or other worsening symptoms.

## 2024-09-10 ENCOUNTER — HOSPITAL ENCOUNTER (OUTPATIENT)
Dept: MAMMOGRAPHY | Facility: HOSPITAL | Age: 64
Discharge: HOME | End: 2024-09-10
Admitting: OBSTETRICS & GYNECOLOGY
Payer: COMMERCIAL

## 2024-09-10 DIAGNOSIS — Z12.31 SCREENING MAMMOGRAM FOR BREAST CANCER: ICD-10-CM

## 2024-09-10 PROCEDURE — 77067 SCR MAMMO BI INCL CAD: CPT

## 2024-09-10 PROCEDURE — 77063 BREAST TOMOSYNTHESIS BI: CPT

## 2024-09-11 ENCOUNTER — TELEPHONE (OUTPATIENT)
Dept: OBSTETRICS AND GYNECOLOGY | Facility: CLINIC | Age: 64
End: 2024-09-11
Payer: COMMERCIAL

## 2024-09-11 NOTE — TELEPHONE ENCOUNTER
If the ER checkout was normal is unlikely to be attributable to the estrogen.  You are on a relatively low dose.  Stress and anxiety are certainly strong factors to be considered.

## 2024-09-11 NOTE — TELEPHONE ENCOUNTER
----- Message from [facility] [employer] sent at 9/10/2024  9:36 PM EDT -----  Regarding: Side effects of Estradiol  Contact: [phone]  You started me back on Estradiol after being off for 3 years.  You told me that there was an increased risk of heart problems or stroke.  I have been experiencing heart palpitations and the feeling of extra air between my heart and throat.  I went to the ER and everything checked out fine.  Could this be related to the Estradiol or possibly other stressors currently happening in my life?  I considered that it may be anxiety.  Not sure if I should discontinue Estradiol or the best way to do so.  The Estradiol has taken care of my hot flashes and night sweats.  I hate to give it up if it's not causing my problem.    Nancy Richardson

## 2025-08-13 ENCOUNTER — TRANSCRIBE ORDERS (OUTPATIENT)
Dept: ADMINISTRATIVE | Facility: HOSPITAL | Age: 65
End: 2025-08-13
Payer: COMMERCIAL

## 2025-08-13 DIAGNOSIS — Z12.31 VISIT FOR SCREENING MAMMOGRAM: Primary | ICD-10-CM

## (undated) DEVICE — ST FLD IRR WARM

## (undated) DEVICE — RICH CYSTO: Brand: MEDLINE INDUSTRIES, INC.

## (undated) DEVICE — SYR LL 3CC

## (undated) DEVICE — BULKAMID URETHRAL BULKING SYSTEM 2ML: Brand: BULKAMID

## (undated) DEVICE — SLV SCD CALF HEMOFORCE DVT THERP REPROC MD

## (undated) DEVICE — CATHETER,FOLEY,SILI-ELAST,LTX,18FR,10ML: Brand: MEDLINE

## (undated) DEVICE — CATH FOL BARDEX 2WY 12F 5CC

## (undated) DEVICE — SOL IRR NACL 0.9PCT 3000ML

## (undated) DEVICE — GLV SURG SENSICARE LT W/ALOE PF LF 7 STRL